# Patient Record
Sex: FEMALE | Race: WHITE | Employment: FULL TIME | URBAN - METROPOLITAN AREA
[De-identification: names, ages, dates, MRNs, and addresses within clinical notes are randomized per-mention and may not be internally consistent; named-entity substitution may affect disease eponyms.]

---

## 2017-09-26 ENCOUNTER — OP HISTORICAL/CONVERTED ENCOUNTER (OUTPATIENT)
Dept: OTHER | Age: 57
End: 2017-09-26

## 2018-07-30 ENCOUNTER — HOSPITAL ENCOUNTER (OUTPATIENT)
Dept: DIABETES SERVICES | Age: 58
Discharge: HOME | End: 2018-07-30
Payer: COMMERCIAL

## 2018-07-30 DIAGNOSIS — E11.9 DIABETES MELLITUS WITHOUT COMPLICATION (HCC): ICD-10-CM

## 2018-07-30 PROCEDURE — G0109 DIAB MANAGE TRN IND/GROUP: HCPCS | Performed by: DIETITIAN, REGISTERED

## 2018-08-16 ENCOUNTER — OFFICE VISIT (OUTPATIENT)
Dept: NEUROLOGY | Age: 58
End: 2018-08-16

## 2018-08-16 VITALS
SYSTOLIC BLOOD PRESSURE: 138 MMHG | HEIGHT: 65 IN | BODY MASS INDEX: 32.59 KG/M2 | HEART RATE: 68 BPM | DIASTOLIC BLOOD PRESSURE: 80 MMHG | WEIGHT: 195.6 LBS | TEMPERATURE: 98 F | OXYGEN SATURATION: 99 %

## 2018-08-16 DIAGNOSIS — R47.01 APHASIA: ICD-10-CM

## 2018-08-16 DIAGNOSIS — R42 DIZZINESS: ICD-10-CM

## 2018-08-16 DIAGNOSIS — R20.0 FACIAL NUMBNESS: ICD-10-CM

## 2018-08-16 DIAGNOSIS — G90.9 ANS (AUTONOMIC NERVOUS SYSTEM) DISEASE: ICD-10-CM

## 2018-08-16 DIAGNOSIS — R29.2 ABNORMAL DTR (DEEP TENDON REFLEX): ICD-10-CM

## 2018-08-16 DIAGNOSIS — R25.9 ABNORMAL INVOLUNTARY MOVEMENT: Primary | ICD-10-CM

## 2018-08-16 RX ORDER — GLIPIZIDE 5 MG/1
5 TABLET ORAL 2 TIMES DAILY
COMMUNITY
End: 2020-01-14

## 2018-08-16 RX ORDER — ROSUVASTATIN CALCIUM 40 MG/1
40 TABLET, COATED ORAL
COMMUNITY
End: 2020-05-20

## 2018-08-16 RX ORDER — ISOSORBIDE MONONITRATE 60 MG/1
60 TABLET, EXTENDED RELEASE ORAL
COMMUNITY

## 2018-08-16 RX ORDER — METFORMIN HYDROCHLORIDE 1000 MG/1
1000 TABLET ORAL 2 TIMES DAILY WITH MEALS
COMMUNITY
End: 2022-03-29 | Stop reason: ALTCHOICE

## 2018-08-16 RX ORDER — GABAPENTIN 600 MG/1
TABLET ORAL 3 TIMES DAILY
COMMUNITY
End: 2020-02-19

## 2018-08-16 RX ORDER — LOSARTAN POTASSIUM AND HYDROCHLOROTHIAZIDE 25; 100 MG/1; MG/1
1 TABLET ORAL DAILY
COMMUNITY

## 2018-08-16 NOTE — PROGRESS NOTES
New patient with numbness in lower lip and left side of face . Sx started  Long ago. Dx with possible TN by neuro 2 years ago , . Had  MRI 2 weeks for neck pain by ortho   Bulge in C2 and 3.

## 2018-08-16 NOTE — MR AVS SNAPSHOT
[address] [place] [address] [address] [facility] [id number] [phone] Patient: Prudencio Muir MRN: [id number] :1960 Visit Information Date & Time Provider Department Dept. Phone Encounter #  
 2018  9:00 AM Sofia Lopez MD [facility] Neurology [facility] [phone] [id number] Follow-up Instructions Return for After Tests. Upcoming Health Maintenance Date Due Hepatitis C Screening 1960 DTaP/Tdap/Td series (1 - Tdap) 1982 PAP AKA CERVICAL CYTOLOGY 1982 BREAST CANCER SCRN MAMMOGRAM 2011 FOBT Q 1 YEAR AGE 50-75 2011 Influenza Age 5 to Adult 2018 Allergies as of 2018  Review Complete On: 2018 By: Sofia Lopez MD  
 No Known Allergies Current Immunizations  Reviewed on 2011 Name Date Influenza Vaccine Whole 2010 Not reviewed this visit You Were Diagnosed With   
  
 Codes Comments Abnormal involuntary movement    -  Primary ICD-10-CM: R25.9 ICD-9-CM: 781.0 Facial numbness     ICD-10-CM: R20.0 ICD-9-CM: 782.0 Aphasia     ICD-10-CM: R47.01 
ICD-9-CM: 784.3 Dizziness     ICD-10-CM: C00 ICD-9-CM: 780.4 ANS (autonomic nervous system) disease     ICD-10-CM: G90.9 ICD-9-CM: 337.9 Abnormal DTR (deep tendon reflex)     ICD-10-CM: R29.2 ICD-9-CM: 796.1 Vitals BP Pulse Temp Height(growth percentile) Weight(growth percentile) SpO2  
 138/80 68 98 °F (36.7 °C) 5' 5\" (1.651 m) 195 lb 9.6 oz (88.7 kg) 99% BMI OB Status Smoking Status 32.55 kg/m2 Postmenopausal Never Smoker BMI and BSA Data Body Mass Index Body Surface Area 32.55 kg/m 2 2.02 m 2 Preferred Pharmacy Pharmacy Name Phone 310 [facility] 53 91 [facility] OF [address] [place] [address] ([phone] Your Updated Medication List  
  
   
 This list is accurate as of 8/16/18  9:44 AM.  Always use your most recent med list.  
  
  
  
  
 aspirin 81 mg tablet Take 81 mg by mouth. BYSTOLIC 5 mg tablet Generic drug:  nebivolol Take 10 mg by mouth daily. CRESTOR 40 mg tablet Generic drug:  rosuvastatin Take 40 mg by mouth nightly.  
  
 gabapentin 600 mg tablet Commonly known as:  NEURONTIN Take  by mouth three (3) times daily. glipiZIDE 5 mg tablet Commonly known as:  Izabella Albertson Take  by mouth two (2) times a day. isosorbide mononitrate ER 60 mg CR tablet Commonly known as:  IMDUR Take  by mouth every morning. losartan-hydroCHLOROthiazide 100-25 mg per tablet Commonly known as:  HYZAAR Take 1 Tab by mouth daily. metFORMIN 1,000 mg tablet Commonly known as:  GLUCOPHAGE Take 1,000 mg by mouth two (2) times daily (with meals). MICRO-K PO Take  by mouth. REPATHA SURECLICK pen injection Generic drug:  evolocumab  
by SubCUTAneous route. We Performed the Following EEG AMB NEURO [id number] CPT(R)] REFERRAL TO NEUROLOGY [MXD62 Custom] Comments:  
 ANS ANS ANS Follow-up Instructions Return for After Tests. To-Do List   
 08/16/2018 Imaging:  DUPLEX CAROTID BILATERAL AMB NEURO   
  
 08/16/2018 Neurology:  EMG LIMITED   
  
 08/16/2018 Imaging:  MRI BRAIN W WO CONT   
  
 09/24/2018 5:30 PM  
  Appointment with DIABETES CLASS #3 [facility] at [address] [facility] [address] ([phone]) Referral Information Referral ID Referred By Referred To  
  
 [id number] [place], [address] [place] [address] Jose, MD Marcus [address] [place] [address] Phone: [phone] Fax: [phone] Visits Status Start Date End Date 1 New Request 8/16/18 8/16/19 If your referral has a status of pending review or denied, additional information will be sent to support the outcome of this decision. Patient Instructions PRESCRIPTION REFILL POLICY [facility] Statement to Patients April 1, 2014 In an effort to ensure the large volume of patient prescription refills is processed in the most efficient and expeditious manner, we are asking our patients to assist us by calling your Pharmacy for all prescription refills, this will include also your  Mail Order Pharmacy. The pharmacy will contact our office electronically to continue the refill process. Please do not wait until the last minute to call your pharmacy. We need at least 48 hours (2days) to fill prescriptions. We also encourage you to call your pharmacy before going to  your prescription to make sure it is ready. With regard to controlled substance prescription refill requests (narcotic refills) that need to be picked up at our office, we ask your cooperation by providing us with at least 72 hours (3days) notice that you will need a refill. We will not refill narcotic prescription refill requests after 4:00pm on any weekday, Monday through Thursday, or after 2:00pm on Fridays, or on the weekends. We encourage everyone to explore another way of getting your prescription refill request processed using Questli, our patient web portal through our electronic medical record system. Questli is an efficient and effective way to communicate your medication request directly to the office and  downloadable as an radha on your smart phone . Questli also features a review functionality that allows you to view your medication list as well as leave messages for your physician. Are you ready to get connected? If so please review the attatched instructions or speak to any of our staff to get you set up right away! Thank you so much for your cooperation. Should you have any questions please contact our Practice Administrator. The Physicians and Staff,  [facility] Information Regarding Testing If you have physican order for a test or a medication denied by your insurance company, this does not mean the test or medication is not appropriate for you as that is a medical decision, not a decision to be made by an insurance company representative or by an [facility] physician who has not interviewed and examined you. This is a decision to be made between you and your physician. The denial of services is a contractual matter between you and your insurance company, not an issue between your physician and the insurance company. If your test or medication is denied, you can take the following steps to help resolve the issue: 1. File a complaint with the [facility] regarding your insurance company's denial of services ordered for you. You can do this either by calling them directly or by completing an on-line complaint form on the [employer]. This can be found at [email or web address] 2. Also file a formal complaint with your insurance company and ask to have the name of the person denying the service so that you may explore a legal option should you be harmed by this denial of service. Again, the fact the insurance company will not pay for the service does not mean it is not medically necessary and I would encourage you to follow through with the plan that was made with your physician 3. File a written complaint with your employer so your employer and benefit manager is aware of the poor coverage they are providing their employees. If you have medicare/medicaid, complain to your representative in the House and to your Nila Thomas. If we have ordered testing for you, we do not call patients with results and we do not give test results over the phone. We schedule follow up appointments so that your results can be discussed in person and any questions you have regarding them may be addressed.   If something of concern is revealed on your test, we will call you for a sooner follow up appointment. Additionally, results may be found by using the My Chart feature and one of our patient service representatives at the  can give you instructions on how to access this feature of our electronic medical record system. Rick Loza 1721 What is a living will? A living will is a legal form you use to write down the kind of care you want at the end of your life. It is used by the health professionals who will treat you if you aren't able to decide for yourself. If you put your wishes in writing, your loved ones and others will know what kind of care you want. They won't need to guess. This can ease your mind and be helpful to others. A living will is not the same as an estate or property will. An estate will explains what you want to happen with your money and property after you die. Is a living will a legal document? A living will is a legal document. Each state has its own laws about living jeong. If you move to another state, make sure that your living will is legal in the state where you now live. Or you might use a universal form that has been approved by many states. This kind of form can sometimes be completed and stored online. Your electronic copy will then be available wherever you have a connection to the Internet. In most cases, doctors will respect your wishes even if you have a form from a different state. · You don't need an  to complete a living will. But legal advice can be helpful if your state's laws are unclear, your health history is complicated, or your family can't agree on what should be in your living will. · You can change your living will at any time. Some people find that their wishes about end-of-life care change as their health changes.  
· In addition to making a living will, think about completing a medical power of  form. This form lets you name the person you want to make end-of-life treatment decisions for you (your \"health care agent\") if you're not able to. Many hospitals and nursing homes will give you the forms you need to complete a living will and a medical power of . · Your living will is used only if you can't make or communicate decisions for yourself anymore. If you become able to make decisions again, you can accept or refuse any treatment, no matter what you wrote in your living will. · Your state may offer an online registry. This is a place where you can store your living will online so the doctors and nurses who need to treat you can find it right away. What should you think about when creating a living will? Talk about your end-of-life wishes with your family members and your doctor. Let them know what you want. That way the people making decisions for you won't be surprised by your choices. Think about these questions as you make your living will: · Do you know enough about life support methods that might be used? If not, talk to your doctor so you know what might be done if you can't breathe on your own, your heart stops, or you're unable to swallow. · What things would you still want to be able to do after you receive life-support methods? Would you want to be able to walk? To speak? To eat on your own? To live without the help of machines? · If you have a choice, where do you want to be cared for? In your home? At a hospital or nursing home? · Do you want certain Sabianism practices performed if you become very ill? · If you have a choice at the end of your life, where would you prefer to die? At home? In a hospital or nursing home? Somewhere else? · Would you prefer to be buried or cremated? · Do you want your organs to be donated after you die? What should you do with your living will?  
· Make sure that your family members and your health care agent have copies of your living will. · Give your doctor a copy of your living will to keep in your medical record. If you have more than one doctor, make sure that each one has a copy. · You may want to put a copy of your living will where it can be easily found. Where can you learn more? Go to http://shereen-samara.info/. Enter X183 in the search box to learn more about \"Learning About Living Estephania Novak. \" Current as of: October 6, 2017 Content Version: 11.7 © 9733-2599 [employer]. Care instructions adapted under license by [employer] (which disclaims liability or warranty for this information). If you have questions about a medical condition or this instruction, always ask your healthcare professional. [address] any warranty or liability for your use of this information. Introducing hospitals & HEALTH SERVICES! [employer] introduces TekLinks patient portal. Now you can access parts of your medical record, email your doctor's office, and request medication refills online. 1. In your internet browser, go to [email or web address] 2. Click on the First Time User? Click Here link in the Sign In box. You will see the New Member Sign Up page. 3. Enter your TekLinks Access Code exactly as it appears below. You will not need to use this code after youve completed the sign-up process. If you do not sign up before the expiration date, you must request a new code. · TekLinks Access Code: [facility] Expires: 10/30/2018  7:30 AM 
 
4. Enter the last four digits of your Social Security Number (xxxx) and Date of Birth (mm/dd/yyyy) as indicated and click Submit. You will be taken to the next sign-up page. 5. Create a TekLinks ID. This will be your TekLinks login ID and cannot be changed, so think of one that is secure and easy to remember. 6. Create a fluid Operationst password. You can change your password at any time. 7. Enter your Password Reset Question and Answer. This can be used at a later time if you forget your password. 8. Enter your e-mail address. You will receive e-mail notification when new information is available in [address] 9. Click Sign Up. You can now view and download portions of your medical record. 10. Click the Download Summary menu link to download a portable copy of your medical information. If you have questions, please visit the Frequently Asked Questions section of the [employer] website. Remember, [employer] is NOT to be used for urgent needs. For medical emergencies, dial 911. Now available from your iPhone and Android! Please provide this summary of care documentation to your next provider. Your primary care clinician is listed as PROVIDER UNKNOWN. If you have any questions after today's visit, please call [phone].

## 2018-08-16 NOTE — PROGRESS NOTES
[address] [facility] [address]   [place] [address]   [facility]    PSXMOY    Fax             Referring: Self    Chief Complaint   Patient presents with    Tingling     New Patient      60-year-old right-handed woman who presents today for evaluation of what she calls facial pain and numbness on the left side as well as uncontrolled shaking of the left hand. She says that several years ago she began to have pain on the left side of the face that she notes is in various places. She notes that it has been progressive and spreading to encompass the entire left side of the face. She notes that the left side of the face is numb. She has difficulty getting out the right words. People would notice. She notes that her speech slurs. She saw a neurologist who told her that she had Bell's palsy. She did have some drooping of the face. She says that got better. She notes that at times she is unable to get out the proper word that seems to be sporadic. She then notes that about 2 months ago she started to have uncontrolled shaking of the left upper extremity. This will last for about 30 minutes. She is unable to hold anything in the hand. She says she was holding a drink and she could not hold that drink in her hand. She then went to get a bottle of water of the refrigerator and the hand was shaking so much that she could not hold it. It continued and her  witnessed this. Her face then started to twitch on the left side. It did not progress into the lower extremity. She maintained consciousness. No double vision. No weakness. The fingers in the left hand have become numb. This has been progressive. No changes in her medication except metoprolol was added. She has not had any injury. No fever chills.   She has been seen by orthopedics thinking this might of been a neck issue because she had a cervical fusion at C6-C7 back in 2016.  She had a recent MRI of the neck that just showed a bulging disc at C2-C3. No radicular type symptom. She has not had any brain imaging. She has not had an EEG. She does have significant risk factors for a vascular issue including hypertension dyslipidemia and uncontrolled diabetes with a last hemoglobin A1c of 5.9. She is going to diabetes class upcoming. She has never had anything like this before. She does note that she gets dizzy not positional noting that she feels lightheaded at times even in the seated position. No spinning. No visual loss or cut. No symptoms on the right side of her body. She does note that her feet are numb and she has pain particularly in the left foot and the left toe and does not get pedicures anymore. She has abnormalities of sweating noting that she will just sweat for no reason. She has not been diagnosed with atrial fibrillation but has had some increased heart rate. That is why the metoprolol was started. Never had an EMG. Never had any autonomic nervous system testing.       Past Medical History:   Diagnosis Date    Anxiety     Arthritis     Constipation     Coronary atherosclerosis of native coronary artery     s/p CABG    DJD (degenerative joint disease)     Fatigue     Headache     High cholesterol     Hypertension     Joint pain     Muscle pain     Neck pain     Nephrolithiasis        Past Surgical History:   Procedure Laterality Date    ARTHROPLASTY,ELBOW,IMPLNT/RECONSTRUC      ECHO 2D ADULT  1/2012    EF50-60%, mild MR    HC DEVICE ABLAT UTER NOVASURE      2007    HX CORONARY ARTERY BYPASS GRAFT  1/2012    LIMA-LAD, SVG-OM1-OM2, SVG-PDA    HX HEART CATHETERIZATION  1/2012    Inf hypo EF 45-50%, LAD 30, 80, 50%; LCx 40%, OM1 90%, RCA 90%,     NM CARDIAC SPECT W STRS/REST MULT  12/2011    NOrmal perfusion, EF 59%       Current Outpatient Prescriptions   Medication Sig Dispense Refill    losartan-hydroCHLOROthiazide (HYZAAR) 100-25 mg per tablet Take 1 Tab by mouth daily.  evolocumab (REPATHA SURECLICK) pen injection by SubCUTAneous route.  metFORMIN (GLUCOPHAGE) 1,000 mg tablet Take 1,000 mg by mouth two (2) times daily (with meals).  isosorbide mononitrate ER (IMDUR) 60 mg CR tablet Take  by mouth every morning.  gabapentin (NEURONTIN) 600 mg tablet Take  by mouth three (3) times daily.  glipiZIDE (GLUCOTROL) 5 mg tablet Take  by mouth two (2) times a day.  rosuvastatin (CRESTOR) 40 mg tablet Take 40 mg by mouth nightly.  nebivolol (BYSTOLIC) 5 mg tablet Take 10 mg by mouth daily.  aspirin 81 mg tablet Take 81 mg by mouth.  POTASSIUM CHLORIDE (MICRO-K PO) Take  by mouth. No Known Allergies    Social History   Substance Use Topics    Smoking status: Never Smoker    Smokeless tobacco: Never Used    Alcohol use No      Comment: occasionally     Family History   Problem Relation Age of Onset    Coronary Artery Disease Mother     Diabetes Mother     Hypertension Mother     Heart Disease Maternal Grandmother     Heart Disease Maternal Grandfather      Review of Systems  Pertinent positives and negatives as noted with remainder of comprehensive review negative    Examination  Visit Vitals    /80    Pulse 68    Temp 98 °F (36.7 °C)    Ht 5' 5\" (1.651 m)    Wt 88.7 kg (195 lb 9.6 oz)    SpO2 99%    BMI 32.55 kg/m2     Pleasant, well appearing overweight woman who has appropriate dress grooming and affect. No icterus. Oropharynx clear. Supple neck without bruit. Heart regular. No murmur. No edema. She is awake alert oriented and conversant. She has normal speech-language cognition and attention. Cranial nerves are intact 2-12. No afferent pupillary defect. She has no pronation drift and her strength is full in the upper and lower extremities in all muscle groups to direct conversational testing. No abnormal movement is noticed on examination today.   Her reflexes are symmetrical in the upper and lower extremities laterally. No ankle jerks are present bilaterally. Toes are mute. No Lia. No ataxia. Her gait is steady. Sensory is intact primary. Impression/Plan  78-year-old woman with complaints of left-sided facial drooping facial numbness on the left side as well and episodic difficulty with aphasia and then coupled with involuntary movements of that left upper extremity as noted above suspicious for ictus all pointing to a right hemispheric abnormality assuming right hemispheric language dominance that would point us to a right hemispheric anatomic abnormality and to that end we will get MRI of the brain looking for such and also EEG to evaluate for epileptiform abnormalities. Also differential would include vascular abnormalities will get a carotid Doppler. Symptoms have been progressive and again would make a mass lesion high on the differential as well. She does have vascular risk factors including hypertension dyslipidemia and uncontrolled diabetes. In regard to that uncontrolled diabetes her examination is also consistent with no ankle jerks and she describes symptoms of dysesthesia in the lower extremities and also disordered sweating as well as dizziness which would point to dysfunction of the autonomic nervous system and given that hemoglobin A1c of greater than 9 and the symptoms of deranged autonomic nervous system dysfunction we will proceed with an EMG and autonomic nervous system testing. She will return at the conclusion of that testing. Cesar Garay MD      This note was created using voice recognition software. Despite editing, there may be syntax errors. This note will not be viewable in [address]

## 2018-08-16 NOTE — PATIENT INSTRUCTIONS
[facility]   Statement to Patients  April 1, 2014      In an effort to ensure the large volume of patient prescription refills is processed in the most efficient and expeditious manner, we are asking our patients to assist us by calling your Pharmacy for all prescription refills, this will include also your  Mail Order Pharmacy. The pharmacy will contact our office electronically to continue the refill process. Please do not wait until the last minute to call your pharmacy. We need at least 48 hours (2days) to fill prescriptions. We also encourage you to call your pharmacy before going to  your prescription to make sure it is ready. With regard to controlled substance prescription refill requests (narcotic refills) that need to be picked up at our office, we ask your cooperation by providing us with at least 72 hours (3days) notice that you will need a refill. We will not refill narcotic prescription refill requests after 4:00pm on any weekday, Monday through Thursday, or after 2:00pm on Fridays, or on the weekends. We encourage everyone to explore another way of getting your prescription refill request processed using [employer], our patient web portal through our electronic medical record system. Windows[employer] is an efficient and effective way to communicate your medication request directly to the office and  downloadable as an radha on your smart phone . [employer] also features a review functionality that allows you to view your medication list as well as leave messages for your physician. Are you ready to get connected? If so please review the attatched instructions or speak to any of our staff to get you set up right away! Thank you so much for your cooperation. Should you have any questions please contact our Practice Administrator.     The Physicians and Staff,  [facility]   Information Regarding Testing     If you have physican order for a test or a medication denied by your insurance company, this does not mean the test or medication is not appropriate for you as that is a medical decision, not a decision to be made by an insurance company representative or by an [facility] physician who has not interviewed and examined you. This is a decision to be made between you and your physician. The denial of services is a contractual matter between you and your insurance company, not an issue between your physician and the insurance company. If your test or medication is denied, you can take the following steps to help resolve the issue:    1. File a complaint with the [facility] regarding your insurance company's denial of services ordered for you. You can do this either by calling them directly or by completing an on-line complaint form on the [employer]. This can be found at [email or web address]    2. Also file a formal complaint with your insurance company and ask to have the name of the person denying the service so that you may explore a legal option should you be harmed by this denial of service. Again, the fact the insurance company will not pay for the service does not mean it is not medically necessary and I would encourage you to follow through with the plan that was made with your physician    3. File a written complaint with your employer so your employer and benefit manager is aware of the poor coverage they are providing their employees. If you have medicare/medicaid, complain to your representative in the House and to your Nila Thomas. If we have ordered testing for you, we do not call patients with results and we do not give test results over the phone. We schedule follow up appointments so that your results can be discussed in person and any questions you have regarding them may be addressed.   If something of concern is revealed on your test, we will call you for a sooner follow up appointment. Additionally, results may be found by using the My Chart feature and one of our patient service representatives at the  can give you instructions on how to access this feature of our electronic medical record system. Learning About Living Ashanti  What is a living will? A living will is a legal form you use to write down the kind of care you want at the end of your life. It is used by the health professionals who will treat you if you aren't able to decide for yourself. If you put your wishes in writing, your loved ones and others will know what kind of care you want. They won't need to guess. This can ease your mind and be helpful to others. A living will is not the same as an estate or property will. An estate will explains what you want to happen with your money and property after you die. Is a living will a legal document? A living will is a legal document. Each state has its own laws about living jeong. If you move to another state, make sure that your living will is legal in the state where you now live. Or you might use a universal form that has been approved by many states. This kind of form can sometimes be completed and stored online. Your electronic copy will then be available wherever you have a connection to the Internet. In most cases, doctors will respect your wishes even if you have a form from a different state. · You don't need an  to complete a living will. But legal advice can be helpful if your state's laws are unclear, your health history is complicated, or your family can't agree on what should be in your living will. · You can change your living will at any time. Some people find that their wishes about end-of-life care change as their health changes. · In addition to making a living will, think about completing a medical power of  form.  This form lets you name the person you want to make end-of-life treatment decisions for you (your \"health care agent\") if you're not able to. Many hospitals and nursing homes will give you the forms you need to complete a living will and a medical power of . · Your living will is used only if you can't make or communicate decisions for yourself anymore. If you become able to make decisions again, you can accept or refuse any treatment, no matter what you wrote in your living will. · Your state may offer an online registry. This is a place where you can store your living will online so the doctors and nurses who need to treat you can find it right away. What should you think about when creating a living will? Talk about your end-of-life wishes with your family members and your doctor. Let them know what you want. That way the people making decisions for you won't be surprised by your choices. Think about these questions as you make your living will:  · Do you know enough about life support methods that might be used? If not, talk to your doctor so you know what might be done if you can't breathe on your own, your heart stops, or you're unable to swallow. · What things would you still want to be able to do after you receive life-support methods? Would you want to be able to walk? To speak? To eat on your own? To live without the help of machines? · If you have a choice, where do you want to be cared for? In your home? At a hospital or nursing home? · Do you want certain Jewish practices performed if you become very ill? · If you have a choice at the end of your life, where would you prefer to die? At home? In a hospital or nursing home? Somewhere else? · Would you prefer to be buried or cremated? · Do you want your organs to be donated after you die? What should you do with your living will? · Make sure that your family members and your health care agent have copies of your living will. · Give your doctor a copy of your living will to keep in your medical record.  If you have more than one doctor, make sure that each one has a copy. · You may want to put a copy of your living will where it can be easily found. Where can you learn more? Go to http://shereen-samara.info/. Enter E965 in the search box to learn more about \"Learning About Living [place]. \"  Current as of: October 6, 2017  Content Version: 11.7  © 2973-4937 [employer]. Care instructions adapted under license by [employer] (which disclaims liability or warranty for this information). If you have questions about a medical condition or this instruction, always ask your healthcare professional. [address] any warranty or liability for your use of this information.

## 2018-08-20 ENCOUNTER — TELEPHONE (OUTPATIENT)
Dept: NEUROLOGY | Age: 58
End: 2018-08-20

## 2018-08-20 NOTE — TELEPHONE ENCOUNTER
----- Message from Mare Wong sent at 8/20/2018  3:37 PM EDT -----  Regarding: Dr Griffin Going from [facility] call back nurse from pt review unit [phone], follow prompts to out pt review case #[id number],   Regarding,Pt test for Autonomic nervus testing  all codes were denied , for test.    Was noted as being experimental  And the guidelines ,     Call the above number and any of the nurses can discuss the appeal options for pt.

## 2018-08-21 NOTE — TELEPHONE ENCOUNTER
Called insurance co spoke to flip persaud  All codes 36668,[id number] denied   expermintal  May do P2P by calling same number and following prompts  Or fax clinicals for reconsideration @[phone]  Include case # and state for reconsideration

## 2018-08-28 ENCOUNTER — OFFICE VISIT (OUTPATIENT)
Dept: NEUROLOGY | Age: 58
End: 2018-08-28

## 2018-08-28 DIAGNOSIS — R41.3 MEMORY CHANGE: ICD-10-CM

## 2018-08-28 DIAGNOSIS — R25.9 ABNORMAL INVOLUNTARY MOVEMENT: Primary | ICD-10-CM

## 2018-08-31 ENCOUNTER — HOSPITAL ENCOUNTER (OUTPATIENT)
Dept: MRI IMAGING | Age: 58
Discharge: HOME | End: 2018-08-31
Attending: PSYCHIATRY & NEUROLOGY
Payer: COMMERCIAL

## 2018-08-31 DIAGNOSIS — R47.01 APHASIA: ICD-10-CM

## 2018-08-31 DIAGNOSIS — R20.0 FACIAL NUMBNESS: ICD-10-CM

## 2018-08-31 DIAGNOSIS — R42 DIZZINESS: ICD-10-CM

## 2018-08-31 DIAGNOSIS — G90.9 ANS (AUTONOMIC NERVOUS SYSTEM) DISEASE: ICD-10-CM

## 2018-08-31 DIAGNOSIS — R25.9 ABNORMAL INVOLUNTARY MOVEMENT: ICD-10-CM

## 2018-08-31 DIAGNOSIS — R29.2 ABNORMAL DTR (DEEP TENDON REFLEX): ICD-10-CM

## 2018-08-31 PROCEDURE — 74011250636 HC RX REV CODE- 250/636: Performed by: PSYCHIATRY & NEUROLOGY

## 2018-08-31 PROCEDURE — A9575 INJ GADOTERATE MEGLUMI 0.1ML: HCPCS | Performed by: PSYCHIATRY & NEUROLOGY

## 2018-08-31 PROCEDURE — 70553 MRI BRAIN STEM W/O & W/DYE: CPT

## 2018-08-31 RX ORDER — GADOTERATE MEGLUMINE 376.9 MG/ML
16 INJECTION INTRAVENOUS
Status: COMPLETED | OUTPATIENT
Start: 2018-08-31 | End: 2018-08-31

## 2018-08-31 RX ADMIN — GADOTERATE MEGLUMINE 16 ML: 376.9 INJECTION INTRAVENOUS at 08:54

## 2018-09-02 NOTE — PROCEDURES
EEG:      Date:  8/28/2018     Requesting Physician:  Shara Kitchen. MD Adalberto    An EEG is requested in this 65 y.o. with abnormal involuntary movements to evaluate for epileptiform abnormalities. Movements said to include uncontrolled shaking of her left hand. Medications:  Medications listed as Hyzaar, Glucophage, Crestor, Neurontin, aspirin, Imdur, Repatha, Glucotrol. This tracing is obtained during the awake, drowsy and sleeping states. During wakefulness there are brief intermittent runs of posteriorly-dominant and symmetrical low-to-medium amplitude 11 cycle per second activities which attenuate with eye opening. The majority of background activity is low-voltage, fast-frequency beta wave activity. Hyperventilation is not performed due to age and medical history. Intermittent photic stimulation induces symmetric posterior driving responses. Stage 2 sleep is attained. Interpretation: This EEG recorded during the awake and sleeping states is normal.  No epileptiform abnormalities are seen.

## 2018-09-07 ENCOUNTER — OFFICE VISIT (OUTPATIENT)
Dept: NEUROLOGY | Age: 58
End: 2018-09-07

## 2018-09-07 DIAGNOSIS — G90.9 ANS (AUTONOMIC NERVOUS SYSTEM) DISEASE: ICD-10-CM

## 2018-09-07 DIAGNOSIS — R20.0 FACIAL NUMBNESS: ICD-10-CM

## 2018-09-07 DIAGNOSIS — R47.01 APHASIA: ICD-10-CM

## 2018-09-07 DIAGNOSIS — R29.2 ABNORMAL DTR (DEEP TENDON REFLEX): ICD-10-CM

## 2018-09-07 DIAGNOSIS — R20.2 PARESTHESIA: Primary | ICD-10-CM

## 2018-09-07 DIAGNOSIS — R25.9 ABNORMAL INVOLUNTARY MOVEMENT: ICD-10-CM

## 2018-09-07 DIAGNOSIS — R42 DIZZINESS: Primary | ICD-10-CM

## 2018-09-07 NOTE — PROCEDURES
EMG/ NCS Report   [facility]  [place], [address] [place], [address]   Ph: [phone]   FAX: [phone]  Test Date:  2018    Patient: Ry Macias : 1960 Physician: Mariana Jane MD   Sex: Female Height: ' \" Ref Phys: Avinash Winter MD   ID#: [id number] Weight:  lbs. Technician: Celena Smith     Patient History / Exam:  CC:TONIA. LOWERS/NEUROPATHY    Having intermittent numbness of the L face, L UE and L LE, (+) uncontrolled DM (+) neck surgery    Patient is coming for neuropathy evaluation. EMG & NCV Findings:  Evaluation of the left Fibular motor and the right Fibular motor nerves showed normal distal onset latency (L3.0, R3.2 ms), normal amplitude (L2.6, R2.7 mV), normal conduction velocity (B Fib-Ankle, L47, R48 m/s), and normal conduction velocity (Poplt-B Fib, L56, R50 m/s). The left tibial motor and the right tibial motor nerves showed normal distal onset latency (L3.7, R5.2 ms), normal amplitude (L12.6, R10.3 mV), and normal conduction velocity (Knee-Ankle, L47, R54 m/s). The left Sup Fibular sensory nerve showed normal distal peak latency (2.3 ms), normal amplitude (10.2 µV), and normal conduction velocity (Lower leg-Lat ankle, 59 m/s). The left sural sensory and the right sural sensory nerves showed normal distal peak latency (L3.3, R3.1 ms) and normal amplitude (L14.9, R19.9 µV). All F Wave latencies were within normal limits. All F Wave left vs. right side latency differences were within normal limits. All H Reflex left vs. right side latency differences were within normal limits. All examined muscles (as indicated in the following table) showed no evidence of electrical instability.         Impression:    Extensive electrodiagnostic examination of the left and right lower extremities is normal.    Specifically, there is no evidence of a peripheral neuropathy or lumbosacral motor radiculopathy.           Jacinto Paniagua MD  Diplomate, American Board of Psychiatry and Neurology  Diplomate, Neuromuscular Medicine  Diplomate, American Board of Electrodiagnostic Medicine  Director, [address] [place] [address] Accredited Laboratory with Exemplary Status      Nerve Conduction Studies  Anti Sensory Summary Table     Stim Site NR Peak (ms) Norm Peak (ms) P-T Amp (µV) Norm P-T Amp Site1 Site2 Dist (cm)   Left Sup Fibular Anti Sensory (Lat ankle)  32°C   Lower leg    2.3 <4.5 10.2 >5 Lower leg Lat ankle 10.0   Right Sup Fibular Anti Sensory (Lat ankle)  33.8°C   Site 2    2.4  12.2       Left Sural Anti Sensory (Lat Mall)  32.6°C   Calf    3.3 <4.5 14.9 >4.0 Calf Lat Mall 14.0   Right Sural Anti Sensory (Lat Mall)  35.4°C   Calf    3.1 <4.5 19.9 >4.0 Calf Lat Mall 14.0     Motor Summary Table     Stim Site NR Onset (ms) Norm Onset (ms) O-P Amp (mV) Norm O-P Amp Amp (Prev) (%) Site1 Site2 Dist (cm) Gideon (m/s) Norm Gideon (m/s)   Left Fibular Motor (Ext Dig Brev)  31.7°C   Ankle    3.0 <6.5 2.6 >2.6 100.0 Ankle Ext Dig Brev 8.0     B Fib    9.8  2.4  92.3 B Fib Ankle 32.0 47 >38   Poplt    11.6  2.4  100.0 Poplt B Fib 10.0 56 >42   Right Fibular Motor (Ext Dig Brev)  33°C   Ankle    3.2 <6.5 2.7 >2.6 100.0 Ankle Ext Dig Brev 8.0     B Fib    9.8  2.5  92.6 B Fib Ankle 32.0 48 >38   Poplt    11.8  2.2  88.0 Poplt B Fib 10.0 50 >42   Left Tibial Motor (Abd Lawrence Brev)  31.2°C   Ankle    3.7 <6.1 12.6 >5.3 100.0 Ankle Abd Lawrence Brev 8.0     Knee    11.2  9.9  78.6 Knee Ankle 35.0 47 >39   Right Tibial Motor (Abd Lawrence Brev)  31.9°C   Ankle    5.2 <6.1 10.3 >5.3 100.0 Ankle Abd Lawrence Brev 8.0     Knee    12.1  7.6  73.8 Knee Ankle 37.0 54 >39     F Wave Studies     NR F-Lat (ms) Lat Norm (ms) L-R F-Lat (ms) L-R Lat Norm   Left Tibial (Mrkrs) (Abd Hallucis)  30.9°C      45.23 <56 5.66 <5.7   Right Tibial (Mrkrs) (Abd Hallucis)  31.1°C      50.89 <56 5.66 <5.7     H Reflex Studies     NR H-Lat (ms) L-R H-Lat (ms) L-R Lat Norm   Left Tibial (Gastroc)  30.8°C      32.67 0.93 <2.0   Right Tibial (Gastroc)  31.1°C      31.73 0.93 <2.0     EMG     Side Muscle Nerve Root Ins Act Fibs Psw Recrt Duration Amp Poly Comment   Left Ext Dig Brev Dp Br Peron L5, S1 Nml Nml Nml Nml Nml Nml Nml    Left AbdHallucis MedPlantar S1-2 Nml Nml Nml Nml Nml Nml Nml    Left AntTibialis Dp Br Peron L4-5 Nml Nml Nml Nml Nml Nml Nml    Left MedGastroc Tibial S1-2 Nml Nml Nml Nml Nml Nml Nml    Left VastusLat Femoral L2-4 Nml Nml Nml Nml Nml Nml Nml    Right Ext Dig Brev Dp Br Peron L5, S1 Nml Nml Nml Nml Nml Nml Nml    Right AbdHallucis MedPlantar S1-2 Nml Nml Nml Nml Nml Nml Nml    Right AntTibialis Dp Br Peron L4-5 Nml Nml Nml Nml Nml Nml Nml    Right MedGastroc Tibial S1-2 Nml Nml Nml Nml Nml Nml Nml    Right VastusLat Femoral L2-4 Nml Nml Nml Nml Nml Nml Nml    Left GluteusMed SupGluteal L4-S1 Nml Nml Nml Nml Nml Nml Nml    Left Lower Lumb Parasp Rami L5,S1 Nml Nml Nml Nml Nml Nml Nml                Nerve Conduction Studies  Anti Sensory Left/Right Comparison     Stim Site L Lat (ms) R Lat (ms) L-R Lat (ms) L Amp (µV) R Amp (µV) L-R Amp (%) Site1 Site2 L Gideon (m/s) R Gideon (m/s) L-R Gideon (m/s)   Sup Fibular Anti Sensory (Lat ankle)  32°C   Lower leg 1.7   10.2   Lower leg Lat ankle 59     Sural Anti Sensory (Lat Mall)  32.6°C   Calf 2.7 2.6 0.1 14.9 19.9 25.1 Calf Lat Mall 52 54 2     Motor Left/Right Comparison     Stim Site L Lat (ms) R Lat (ms) L-R Lat (ms) L Amp (mV) R Amp (mV) L-R Amp (%) Site1 Site2 L Gideon (m/s) R Gideon (m/s) L-R Gideon (m/s)   Fibular Motor (Ext Dig Brev)  31.7°C   Ankle 3.0 3.2 0.2 2.6 2.7 3.7 Ankle Ext Dig Brev      B Fib 9.8 9.8 0.0 2.4 2.5 4.0 B Fib Ankle 47 48 1   Poplt 11.6 11.8 0.2 2.4 2.2 8.3 Poplt B Fib 56 50 6   Tibial Motor (Abd Lawrence Brev)  31.2°C   Ankle 3.7 5.2 1.5 12.6 10.3 18.3 Ankle Abd Lawrence Brev      Knee 11.2 12.1 0.9 9.9 7.6 23.2 Knee Ankle 47 54 7         Waveforms:

## 2018-09-10 ENCOUNTER — HOSPITAL ENCOUNTER (OUTPATIENT)
Dept: DIABETES SERVICES | Age: 58
Discharge: HOME | End: 2018-09-10
Attending: FAMILY MEDICINE
Payer: COMMERCIAL

## 2018-09-10 DIAGNOSIS — E11.9 DIABETES MELLITUS WITHOUT COMPLICATION (HCC): ICD-10-CM

## 2018-09-10 PROCEDURE — G0109 DIAB MANAGE TRN IND/GROUP: HCPCS | Performed by: DIETITIAN, REGISTERED

## 2018-09-13 NOTE — PROCEDURES
Carotid Doppler:     Date:  9/7/2018     Requesting Physician:  Natalya Perez MD     Indication:  Dizziness, aphasia, abnormal movements     B-mode imaging reveals mild plaque at the bifurcations bilaterally. Doppler spectral analysis reveals no significant velocity shifts. Vertebral artery flow antegrade bilaterally. Interpretation:    1. Plaque as noted. 2. No significant stenosis.

## 2018-09-19 ENCOUNTER — OFFICE VISIT (OUTPATIENT)
Dept: NEUROLOGY | Age: 58
End: 2018-09-19

## 2018-09-19 VITALS
SYSTOLIC BLOOD PRESSURE: 148 MMHG | WEIGHT: 195 LBS | HEIGHT: 65 IN | BODY MASS INDEX: 32.49 KG/M2 | DIASTOLIC BLOOD PRESSURE: 96 MMHG

## 2018-09-19 DIAGNOSIS — R20.0 FACIAL NUMBNESS: Primary | ICD-10-CM

## 2018-09-19 DIAGNOSIS — R25.9 ABNORMAL INVOLUNTARY MOVEMENTS: ICD-10-CM

## 2018-09-19 DIAGNOSIS — R51.9 INTRACTABLE HEADACHE, UNSPECIFIED CHRONICITY PATTERN, UNSPECIFIED HEADACHE TYPE: ICD-10-CM

## 2018-09-19 RX ORDER — AMITRIPTYLINE HYDROCHLORIDE 25 MG/1
25 TABLET, FILM COATED ORAL
Qty: 30 TAB | Refills: 5 | Status: SHIPPED | OUTPATIENT
Start: 2018-09-19 | End: 2019-05-07 | Stop reason: SDUPTHER

## 2018-09-19 NOTE — PROGRESS NOTES
Prudencio Muir is a 65 y.o. female who presents with the following  Chief Complaint   Patient presents with    Results       HPI Patient comes in for a follow up for MRI brain, EEG, doppler, and EMG to evaluate involuntary movements of bilateral UE, aphasia, visual disturbance, left sided facial numbness, tingling. These symptoms happen on a daily basis and with no inciting factor. She has noticed the symptoms are getting more frequent and noticeable by her work, family. She does have a hx of  A cervical fusion and did have a recent MRI c spine which we will get this result from as evaluation of her arm and hand numbness on both UE. She does have some trouble sleeping. Does have some slight issues with her mood. Neck pain, chronic daily headaches also. She feels like they are coming from the neck. She states the pain is daily. The pain is tight. She gives an example also of being zoned out co-worker noticed that she was confused, pupils very dilated, and just completely out of it for a while and gradually this got better. This has happened a few times before. Her report was jibberish. Was tired, sleepy the rest of the day. No Known Allergies    Current Outpatient Prescriptions   Medication Sig    amitriptyline (ELAVIL) 25 mg tablet Take 1 Tab by mouth nightly.  losartan-hydroCHLOROthiazide (HYZAAR) 100-25 mg per tablet Take 1 Tab by mouth daily.  evolocumab (REPATHA SURECLICK) pen injection by SubCUTAneous route.  metFORMIN (GLUCOPHAGE) 1,000 mg tablet Take 1,000 mg by mouth two (2) times daily (with meals).  isosorbide mononitrate ER (IMDUR) 60 mg CR tablet Take  by mouth every morning.  gabapentin (NEURONTIN) 600 mg tablet Take  by mouth three (3) times daily.  glipiZIDE (GLUCOTROL) 5 mg tablet Take  by mouth two (2) times a day.  rosuvastatin (CRESTOR) 40 mg tablet Take 40 mg by mouth nightly.  nebivolol (BYSTOLIC) 5 mg tablet Take 10 mg by mouth daily.     aspirin 81 mg tablet Take 81 mg by mouth.  POTASSIUM CHLORIDE (MICRO-K PO) Take  by mouth. No current facility-administered medications for this visit. History   Smoking Status    Never Smoker   Smokeless Tobacco    Never Used       Past Medical History:   Diagnosis Date    Anxiety     Arthritis     Constipation     Coronary atherosclerosis of native coronary artery     s/p CABG    DJD (degenerative joint disease)     Fatigue     Headache     High cholesterol     Hypertension     Joint pain     Muscle pain     Neck pain     Nephrolithiasis        Past Surgical History:   Procedure Laterality Date    ARTHROPLASTY,ELBOW,IMPLNT/RECONSTRUC      ECHO 2D ADULT  1/2012    EF50-60%, mild MR    HC DEVICE ABLAT UTER NOVASURE      2007    HX CORONARY ARTERY BYPASS GRAFT  1/2012    LIMA-LAD, SVG-OM1-OM2, SVG-PDA    HX HEART CATHETERIZATION  1/2012    Inf hypo EF 45-50%, LAD 30, 80, 50%; LCx 40%, OM1 90%, RCA 90%,     NM CARDIAC SPECT W STRS/REST MULT  12/2011    NOrmal perfusion, EF 59%       Family History   Problem Relation Age of Onset    Coronary Artery Disease Mother     Diabetes Mother     Hypertension Mother     Heart Disease Maternal Grandmother     Heart Disease Maternal Grandfather        Social History     Social History    Marital status:      Spouse name: N/A    Number of children: N/A    Years of education: N/A     Social History Main Topics    Smoking status: Never Smoker    Smokeless tobacco: Never Used    Alcohol use No      Comment: occasionally    Drug use: None    Sexual activity: Not Asked     Other Topics Concern    None     Social History Narrative       Review of Systems   Eyes: Positive for blurred vision and photophobia. Gastrointestinal: Negative for nausea and vomiting. Neurological: Positive for tingling, sensory change and headaches. Negative for dizziness, tremors, seizures and loss of consciousness. Remainder of comprehensive review is negative. Physical Exam :    Visit Vitals    BP (!) 148/96    Ht 5' 5\" (1.651 m)    Wt 88.5 kg (195 lb)    BMI 32.45 kg/m2             Results for orders placed or performed in visit on 46/59/40   METABOLIC PANEL, COMPREHENSIVE   Result Value Ref Range    Glucose 93 65 - 99 mg/dL    BUN 12 6 - 24 mg/dL    Creatinine 0.64 0.57 - 1.00 mg/dL    GFR est non- >59 mL/min/1.73    GFR est  >59 mL/min/1.73    BUN/Creatinine ratio 19 9 - 23    Sodium 138 134 - 144 mmol/L    Potassium 4.1 3.5 - 5.2 mmol/L    Chloride 100 97 - 108 mmol/L    CO2 23 20 - 32 mmol/L    Calcium 9.4 8.7 - 10.2 mg/dL    Protein, total 6.9 6.0 - 8.5 g/dL    Albumin 4.4 3.5 - 5.5 g/dL    GLOBULIN, TOTAL 2.5 1.5 - 4.5 g/dL    A-G Ratio 1.8 1.1 - 2.5    Bilirubin, total 0.6 0.0 - 1.2 mg/dL    Alk.  phosphatase 74 25 - 150 IU/L    AST (SGOT) 12 0 - 40 IU/L    ALT (SGPT) 20 0 - 40 IU/L   TROPONIN I   Result Value Ref Range    Troponin-I, Qt. <0.31 <0.31 ng/mL   LIPID CASCADE   Result Value Ref Range    Cholesterol, total 198 100 - 199 mg/dL    HDL Cholesterol 41 >39 mg/dL    LDL/HDL Ratio 3.0 0.0 - 3.2 ratio units    Non-HDL Cholesterol 157 (H) 0 - 129 mg/dL    Triglyceride 173 (H) 0 - 149 mg/dL    LDL, calculated 122 (H) 0 - 99 mg/dL   CBC W/O DIFF   Result Value Ref Range    WBC 9.0 4.0 - 10.5 x10E3/uL    RBC 4.32 3.80 - 5.10 x10E6/uL    HGB 12.3 11.5 - 15.0 g/dL    HCT 37.9 34.0 - 44.0 %    MCV 88 80 - 98 fL    MCH 28.5 27.0 - 34.0 pg    MCHC 32.5 32.0 - 36.0 g/dL    RDW 14.4 11.7 - 15.0 %    PLATELET 059 467 - 693 x10E3/uL   TSH, 3RD GENERATION   Result Value Ref Range    TSH 1.860 0.450 - 4.500 uIU/mL   LIPOPROTEIN ANALYSIS, BY NMR   Result Value Ref Range    LDL-P 2115 (H) <1000 nmol/L    HDL-P (Total) 27.1 (L) >=30.5 umol/L    Small LDL-P 1442 (H) <=527 nmol/L    LDL size 20.1 (L) >20.5 nm    LP-IR SCORE 59 (H) <=45       Orders Placed This Encounter    NEURO EEG 24 HR      Standing Status:   Future     Standing Expiration Date:   3/19/2019 Order Specific Question:   Reason for Exam:     Answer:   seizure disorder    amitriptyline (ELAVIL) 25 mg tablet     Sig: Take 1 Tab by mouth nightly. Dispense:  30 Tab     Refill:  5       1. Facial numbness    2. Abnormal involuntary movements    3. Intractable headache, unspecified chronicity pattern, unspecified headache type        Follow-up Disposition:  Return after 24 hour EEG. MRI brain, EEG, doppler, and EMG all normal. We discussed these in full and she has no questions. We discussed a 24 hour EEG needed to try to capture an event on EEG looking at epileptiform. We will try Elavil 25 mg nightly to help with sleep, possible trigeminal neuralgia symptoms on the left side, neck pain, cervicogenic headaches and mood as this can help all. She understands her POC and has no questions about moving forward.        This note will not be viewable in [facility]

## 2018-09-19 NOTE — PATIENT INSTRUCTIONS
[facility]   Statement to Patients  April 1, 2014      In an effort to ensure the large volume of patient prescription refills is processed in the most efficient and expeditious manner, we are asking our patients to assist us by calling your Pharmacy for all prescription refills, this will include also your  Mail Order Pharmacy. The pharmacy will contact our office electronically to continue the refill process. Please do not wait until the last minute to call your pharmacy. We need at least 48 hours (2days) to fill prescriptions. We also encourage you to call your pharmacy before going to  your prescription to make sure it is ready. With regard to controlled substance prescription refill requests (narcotic refills) that need to be picked up at our office, we ask your cooperation by providing us with at least 72 hours (3days) notice that you will need a refill. We will not refill narcotic prescription refill requests after 4:00pm on any weekday, Monday through Thursday, or after 2:00pm on Fridays, or on the weekends. We encourage everyone to explore another way of getting your prescription refill request processed using [employer] our patient web portal through our electronic medical record system. [employer] is an efficient and effective way to communicate your medication request directly to the office and  downloadable as an radha on your smart phone . [employer] also features a review functionality that allows you to view your medication list as well as leave messages for your physician. Are you ready to get connected? If so please review the attatched instructions or speak to any of our staff to get you set up right away! Thank you so much for your cooperation. Should you have any questions please contact our Practice Administrator.     The Physicians and Staff,  [facility]

## 2018-09-19 NOTE — MR AVS SNAPSHOT
[address] [facility] [address] [facility] [id number] [phone] Patient: Roxanna Dueñas MRN: [id number] :1960 Visit Information Date & Time Provider Department Dept. Phone Encounter #  
 2018  9:30 AM Katelynn Kellogg NP [facility] [phone] [id number] Follow-up Instructions Return after 24 hour EEG. Upcoming Health Maintenance Date Due Hepatitis C Screening 1960 DTaP/Tdap/Td series (1 - Tdap) 1982 PAP AKA CERVICAL CYTOLOGY 1982 BREAST CANCER SCRN MAMMOGRAM 2011 FOBT Q 1 YEAR AGE 50-75 2011 Influenza Age 5 to Adult 2018 Allergies as of 2018  Review Complete On: 2018 By: Kenneth Mccann No Known Allergies Current Immunizations  Reviewed on 2011 Name Date Influenza Vaccine Whole 2010 Not reviewed this visit You Were Diagnosed With   
  
 Codes Comments Facial numbness    -  Primary ICD-10-CM: R20.0 ICD-9-CM: 782.0 Abnormal involuntary movements     ICD-10-CM: R25.9 ICD-9-CM: 781.0 Intractable headache, unspecified chronicity pattern, unspecified headache type     ICD-10-CM: R51 ICD-9-CM: 052. 0 Vitals BP Height(growth percentile) Weight(growth percentile) BMI OB Status Smoking Status (!) 148/96 5' 5\" (1.651 m) 195 lb (88.5 kg) 32.45 kg/m2 Postmenopausal Never Smoker BMI and BSA Data Body Mass Index Body Surface Area  
 32.45 kg/m 2 2.01 m 2 Preferred Pharmacy [facility] Name Phone [facility] [address] [facility] [address] [address] Your Updated Medication List  
  
   
This list is accurate as of 18 10:01 AM.  Always use your most recent med list.  
  
  
  
  
 amitriptyline 25 mg tablet Commonly known as:  ELAVIL Take 1 Tab by mouth nightly. aspirin 81 mg tablet Take 81 mg by mouth. BYSTOLIC 5 mg tablet Generic drug:  nebivolol Take 10 mg by mouth daily. CRESTOR 40 mg tablet Generic drug:  rosuvastatin Take 40 mg by mouth nightly.  
  
 gabapentin 600 mg tablet Commonly known as:  NEURONTIN Take  by mouth three (3) times daily. glipiZIDE 5 mg tablet Commonly known as:  Lawrence Meuse Take  by mouth two (2) times a day. isosorbide mononitrate ER 60 mg CR tablet Commonly known as:  IMDUR Take  by mouth every morning. losartan-hydroCHLOROthiazide 100-25 mg per tablet Commonly known as:  HYZAAR Take 1 Tab by mouth daily. metFORMIN 1,000 mg tablet Commonly known as:  GLUCOPHAGE Take 1,000 mg by mouth two (2) times daily (with meals). MICRO-K PO Take  by mouth. REPATHA SURECLICK pen injection Generic drug:  evolocumab  
by SubCUTAneous route. Prescriptions Sent to Pharmacy Refills  
 amitriptyline (ELAVIL) 25 mg tablet 5 Sig: Take 1 Tab by mouth nightly. Class: Normal  
 [facility] [address] [address] [address] [place] [address] Ph #: [phone] Route: Oral  
  
Follow-up Instructions Return after 24 hour EEG. To-Do List   
 09/19/2018 Neurology:  NEURO EEG 24 HR    
  
 09/24/2018 5:30 PM  
  Appointment with DIABETES CLASS #3 [facility] at [facility] ([phone]) Patient Instructions PRESCRIPTION REFILL POLICY [facility] Statement to Patients April 1, 2014 In an effort to ensure the large volume of patient prescription refills is processed in the most efficient and expeditious manner, we are asking our patients to assist us by calling your Pharmacy for all prescription refills, this will include also your  Mail Order Pharmacy. The pharmacy will contact our office electronically to continue the refill process. Please do not wait until the last minute to call your pharmacy.  We need at least 48 hours (2days) to fill prescriptions. We also encourage you to call your pharmacy before going to  your prescription to make sure it is ready. With regard to controlled substance prescription refill requests (narcotic refills) that need to be picked up at our office, we ask your cooperation by providing us with at least 72 hours (3days) notice that you will need a refill. We will not refill narcotic prescription refill requests after 4:00pm on any weekday, Monday through Thursday, or after 2:00pm on Fridays, or on the weekends. We encourage everyone to explore another way of getting your prescription refill request processed using [employer], our patient web portal through our electronic medical record system. [employer] is an efficient and effective way to communicate your medication request directly to the office and  downloadable as an radha on your smart phone . [employer] also features a review functionality that allows you to view your medication list as well as leave messages for your physician. Are you ready to get connected? If so please review the attatched instructions or speak to any of our staff to get you set up right away! Thank you so much for your cooperation. Should you have any questions please contact our Practice Administrator. The Physicians and Staff,  [facility] Introducing [facility]! [facility] introduces [employer] patient portal. Now you can access parts of your medical record, email your doctor's office, and request medication refills online. 1. In your internet browser, go to [email or web address] 2. Click on the First Time User? Click Here link in the Sign In box. You will see the New Member Sign Up page. 3. Enter your [employer] Access Code exactly as it appears below. You will not need to use this code after youve completed the sign-up process.  If you do not sign up before the expiration date, you must request a new code. · [employer] Access Code: [facility] Expires: 10/30/2018  7:30 AM 
 
4. Enter the last four digits of your Social Security Number (xxxx) and Date of Birth (mm/dd/yyyy) as indicated and click Submit. You will be taken to the next sign-up page. 5. Create a [employer] ID. This will be your [employer] login ID and cannot be changed, so think of one that is secure and easy to remember. 6. Create a [employer] password. You can change your password at any time. 7. Enter your Password Reset Question and Answer. This can be used at a later time if you forget your password. 8. Enter your e-mail address. You will receive e-mail notification when new information is available in [address] 9. Click Sign Up. You can now view and download portions of your medical record. 10. Click the Download Summary menu link to download a portable copy of your medical information. If you have questions, please visit the Frequently Asked Questions section of the [employer] website. Remember, [employer] is NOT to be used for urgent needs. For medical emergencies, dial 911. Now available from your iPhone and Android! Please provide this summary of care documentation to your next provider. Your primary care clinician is listed as Gissell Felix. If you have any questions after today's visit, please call [phone].

## 2018-09-20 ENCOUNTER — TELEPHONE (OUTPATIENT)
Dept: NEUROLOGY | Age: 58
End: 2018-09-20

## 2018-09-20 NOTE — TELEPHONE ENCOUNTER
Pt is calling to let the NP know she is having one of those spells that was discussed in appt on 09/19/18. She would like a call back to discuss.  Best contact [phone]

## 2018-09-21 NOTE — TELEPHONE ENCOUNTER
Attempted to contact patient. Left message on voicemail with NP's response    Per NP:      Ok thanks.  Continue current treatment and get the 24 hour EEG   Thanks (Routing comment)

## 2018-10-04 ENCOUNTER — HOSPITAL ENCOUNTER (OUTPATIENT)
Dept: NEUROLOGY | Age: 58
Discharge: HOME | End: 2018-10-04
Attending: NURSE PRACTITIONER
Payer: COMMERCIAL

## 2018-10-04 DIAGNOSIS — R51.9 INTRACTABLE HEADACHE, UNSPECIFIED CHRONICITY PATTERN, UNSPECIFIED HEADACHE TYPE: ICD-10-CM

## 2018-10-04 DIAGNOSIS — R20.0 FACIAL NUMBNESS: ICD-10-CM

## 2018-10-04 DIAGNOSIS — R25.9 ABNORMAL INVOLUNTARY MOVEMENTS: ICD-10-CM

## 2018-10-04 PROCEDURE — 95953 NEURO EEG 24 HR: CPT

## 2018-10-12 ENCOUNTER — TELEPHONE (OUTPATIENT)
Dept: NEUROLOGY | Age: 58
End: 2018-10-12

## 2018-10-12 NOTE — TELEPHONE ENCOUNTER
Pt called to say that she got the 24 EEG device and has turned it back in. She was not sure if a f/u appt was still needed to review. Her best contact [phone].

## 2018-10-12 NOTE — TELEPHONE ENCOUNTER
Attempted to return patient's call. Left message on voicemail for return call. Per NP:  Dr. Matilde Haddad read the 24 hour EEG as normal. No events captured. Still waiting on ANS testing to be approved. If she is still feeling like she is having issues we cna get her into the EMU at [facility] to try to provoke a episode to see what she does and have the brainwave test on at the same time.    This would mean she has to be admitted to the hospital to do this   Thanks (Routing comment)

## 2018-10-12 NOTE — TELEPHONE ENCOUNTER
Patient returned call. She states that she has only had 1 episodes in the past 2 weeks. She cannot place this to a pattern. She is going to consider the EMU and call us back next week.

## 2018-10-17 ENCOUNTER — HOSPITAL ENCOUNTER (OUTPATIENT)
Dept: DIABETES SERVICES | Age: 58
Discharge: HOME | End: 2018-10-17
Attending: FAMILY MEDICINE
Payer: COMMERCIAL

## 2018-10-17 DIAGNOSIS — E11.9 DIABETES MELLITUS WITHOUT COMPLICATION (HCC): ICD-10-CM

## 2018-10-17 PROCEDURE — G0109 DIAB MANAGE TRN IND/GROUP: HCPCS | Performed by: DIETITIAN, REGISTERED

## 2019-05-07 DIAGNOSIS — R51.9 INTRACTABLE HEADACHE, UNSPECIFIED CHRONICITY PATTERN, UNSPECIFIED HEADACHE TYPE: ICD-10-CM

## 2019-05-07 DIAGNOSIS — R25.9 ABNORMAL INVOLUNTARY MOVEMENTS: ICD-10-CM

## 2019-05-07 DIAGNOSIS — R20.0 FACIAL NUMBNESS: ICD-10-CM

## 2019-05-07 RX ORDER — AMITRIPTYLINE HYDROCHLORIDE 25 MG/1
TABLET, FILM COATED ORAL
Qty: 30 TAB | Refills: 5 | Status: SHIPPED | OUTPATIENT
Start: 2019-05-07 | End: 2019-07-17 | Stop reason: SDUPTHER

## 2019-05-09 ENCOUNTER — HOSPITAL ENCOUNTER (OUTPATIENT)
Dept: DIABETES SERVICES | Age: 59
Discharge: HOME | End: 2019-05-09
Attending: FAMILY MEDICINE
Payer: COMMERCIAL

## 2019-05-09 DIAGNOSIS — E11.9 DIABETES MELLITUS WITHOUT COMPLICATION (HCC): ICD-10-CM

## 2019-05-09 PROCEDURE — G0109 DIAB MANAGE TRN IND/GROUP: HCPCS | Performed by: DIETITIAN, REGISTERED

## 2019-07-17 DIAGNOSIS — R51.9 INTRACTABLE HEADACHE, UNSPECIFIED CHRONICITY PATTERN, UNSPECIFIED HEADACHE TYPE: ICD-10-CM

## 2019-07-17 DIAGNOSIS — R25.9 ABNORMAL INVOLUNTARY MOVEMENTS: ICD-10-CM

## 2019-07-17 DIAGNOSIS — R20.0 FACIAL NUMBNESS: ICD-10-CM

## 2019-07-17 RX ORDER — AMITRIPTYLINE HYDROCHLORIDE 25 MG/1
TABLET, FILM COATED ORAL
Qty: 90 TAB | Refills: 1 | Status: SHIPPED | OUTPATIENT
Start: 2019-07-17 | End: 2020-02-19

## 2020-01-13 NOTE — PROGRESS NOTES
Silvina Mathur is a 63 y.o. female here for   Chief Complaint   Patient presents with    New Patient     referred for DM and Thyroid       Functional glucose monitor and record keeping system? - yes  Eye exam within last year? -due   Foot exam within last year? - due    1. Have you been to the ER, urgent care clinic since your last visit? Hospitalized since your last visit? -n/a    2. Have you seen or consulted any other health care providers outside of the [address] [place] [address] since your last visit?   Include any pap smears or colon screening.-n/a

## 2020-01-14 ENCOUNTER — OFFICE VISIT (OUTPATIENT)
Dept: ENDOCRINOLOGY | Age: 60
End: 2020-01-14

## 2020-01-14 VITALS
SYSTOLIC BLOOD PRESSURE: 138 MMHG | BODY MASS INDEX: 32.99 KG/M2 | DIASTOLIC BLOOD PRESSURE: 74 MMHG | WEIGHT: 198 LBS | RESPIRATION RATE: 16 BRPM | HEIGHT: 65 IN | TEMPERATURE: 97.1 F | HEART RATE: 74 BPM | OXYGEN SATURATION: 99 %

## 2020-01-14 DIAGNOSIS — E11.65 TYPE 2 DIABETES MELLITUS WITH HYPERGLYCEMIA, WITH LONG-TERM CURRENT USE OF INSULIN (HCC): Primary | ICD-10-CM

## 2020-01-14 DIAGNOSIS — E78.2 MIXED HYPERLIPIDEMIA: ICD-10-CM

## 2020-01-14 DIAGNOSIS — Z79.4 TYPE 2 DIABETES MELLITUS WITH HYPERGLYCEMIA, WITH LONG-TERM CURRENT USE OF INSULIN (HCC): Primary | ICD-10-CM

## 2020-01-14 DIAGNOSIS — I10 ESSENTIAL HYPERTENSION: ICD-10-CM

## 2020-01-14 DIAGNOSIS — G62.9 POLYNEUROPATHY, UNSPECIFIED: ICD-10-CM

## 2020-01-14 LAB
GLUCOSE POC: 291 MG/DL
HBA1C MFR BLD HPLC: 10.8 %

## 2020-01-14 RX ORDER — PIOGLITAZONEHYDROCHLORIDE 30 MG/1
30 TABLET ORAL DAILY
Qty: 30 TAB | Refills: 11 | Status: SHIPPED | OUTPATIENT
Start: 2020-01-14 | End: 2022-03-29 | Stop reason: ALTCHOICE

## 2020-01-14 RX ORDER — MELATONIN
1000 DAILY
COMMUNITY

## 2020-01-14 RX ORDER — ATORVASTATIN CALCIUM 40 MG/1
40 TABLET, FILM COATED ORAL DAILY
COMMUNITY

## 2020-01-14 NOTE — PROGRESS NOTES
Nahun Roy MD          Patient Information  Date:1/14/2020  Name : Prudencio Muir 63 y.o.     YOB: 1960         Referred by: Petrona Rosales MD         Chief Complaint   Patient presents with   Nolia Stamp New Patient     referred for DM and Thyroid       History of Present Illness: Prudencio Muir is a 63 y.o. female here for initial visit of  Type 2 Diabetes Mellitus. Type 2 Diabetes was diagnosed 2017 . Endorgan effects of diabetes: Peripheral neuropathy  She is on metformin, glipizide, Lantus, Lantus was increased 2 months ago to 50 units  She is checking the blood glucose once daily, they are ranging from 200-300  Last A1C was high and symptoms include  polyuria, polydipsia  Hypoglycemia: no  Breakfast  Eggs , protein shake  Lunch soup and salad  Dinner -sometimes cereal, soup  No sodas   No exercise  Complains of foot pain, fatigue    Symptomatic goiter, status post right thyroidectomy, no thyroid cancer      Weight trend: stable  Prior visit with dietician: yes       Wt Readings from Last 3 Encounters:   01/14/20 198 lb (89.8 kg)   09/19/18 195 lb (88.5 kg)   08/16/18 195 lb 9.6 oz (88.7 kg)       BP Readings from Last 3 Encounters:   01/14/20 138/74   09/19/18 (!) 148/96   08/16/18 138/80           Past Medical History:   Diagnosis Date    Anxiety     Arthritis     Constipation     Coronary atherosclerosis of native coronary artery     s/p CABG    DJD (degenerative joint disease)     Fatigue     Headache     High cholesterol     Hypertension     Joint pain     Muscle pain     Neck pain     Nephrolithiasis      Current Outpatient Medications   Medication Sig    cholecalciferol (VITAMIN D3) (1000 Units /25 mcg) tablet Take 1,000 Units by mouth daily.  insulin glargine,hum.rec.anlog (LANTUS SOLOSTAR U-100 INSULIN SC) 50 Units by SubCUTAneous route nightly.     atorvastatin (LIPITOR) 40 mg tablet Take 40 mg by mouth daily.    losartan-hydroCHLOROthiazide (HYZAAR) 100-25 mg per tablet Take 1 Tab by mouth daily.  evolocumab (REPATHA SURECLICK) pen injection 646 mg by SubCUTAneous route every fourteen (14) days.  metFORMIN (GLUCOPHAGE) 1,000 mg tablet Take 1,000 mg by mouth two (2) times daily (with meals).  isosorbide mononitrate ER (IMDUR) 60 mg CR tablet Take 60 mg by mouth every morning.  gabapentin (NEURONTIN) 600 mg tablet Take  by mouth three (3) times daily.  glipiZIDE (GLUCOTROL) 5 mg tablet Take 5 mg by mouth two (2) times a day.  nebivolol (BYSTOLIC) 10 mg tablet Take 10 mg by mouth daily.  aspirin 81 mg tablet Take 81 mg by mouth daily.  amitriptyline (ELAVIL) 25 mg tablet TAKE 1 TABLET BY MOUTH EVERY DAY AT NIGHT    rosuvastatin (CRESTOR) 40 mg tablet Take 40 mg by mouth nightly.  POTASSIUM CHLORIDE (MICRO-K PO) Take  by mouth. No current facility-administered medications for this visit. No Known Allergies    Review of Systems:  All 10 systems reviewed and are negative other than mentioned in HPI    Physical Examination:   Blood pressure 138/74, pulse 74, temperature 97.1 °F (36.2 °C), temperature source Oral, resp. rate 16, height 5' 5\" (1.651 m), weight 198 lb (89.8 kg), SpO2 99 %. Estimated body mass index is 32.95 kg/m² as calculated from the following:  -   Height as of this encounter: 5' 5\" (1.651 m). -   Weight as of this encounter: 198 lb (89.8 kg).   - General: pleasant, no distress, good eye contact  - HEENT: no pallor, no periorbital edema, EOMI  - Neck: supple, no thyromegaly, no nodules  - Cardiovascular: regular,  normal S1 and S2,  - Respiratory: clear to auscultation bilaterally  - Gastrointestinal: soft, nontender, nondistended,  BS +  - Musculoskeletal: no proximal muscle weakness in upper or lower extremities  - Integumentary: No edema  - Neurological: alert and oriented  - Psychiatric: normal mood and affect  - Skin: color, texture, turgor normal. Diabetic Foot and Eye Exam HM Status   Topic Date Due    Diabetic Foot Care  12/06/1971    Eye Exam  12/06/1971       Diabetic foot exam: January 2020  Left:     Vibratory sensation absent   Filament test decreased sensation with micro filament   Pulse DP: 1+    Deformities: None  Right:    Vibratory sensation absent   Filament test decreased sensation with micro filament   Pulse DP: 1+   Deformities: None        Data Reviewed:       Lab Results   Component Value Date/Time    Glucose 93 03/16/2012 12:00 AM    Glucose (POC) 86 05/11/2011 10:01 PM    Glucose  01/14/2020 03:28 PM    LDL, calculated 122 (H) 03/16/2012 12:00 AM    Creatinine (POC) 0.7 09/29/2011 12:06 PM    Creatinine 0.64 03/16/2012 12:00 AM      Lab Results   Component Value Date/Time    GFR est non- 03/16/2012 12:00 AM    GFRNA, POC >60 09/29/2011 12:06 PM    GFR est  03/16/2012 12:00 AM    GFRAA, POC >60 09/29/2011 12:06 PM    Creatinine 0.64 03/16/2012 12:00 AM    Creatinine (POC) 0.7 09/29/2011 12:06 PM    BUN 12 03/16/2012 12:00 AM    Sodium 138 03/16/2012 12:00 AM    Potassium 4.1 03/16/2012 12:00 AM    Chloride 100 03/16/2012 12:00 AM    CO2 23 03/16/2012 12:00 AM       Assessment/Plan:     1. Type 2 diabetes mellitus with hyperglycemia, with long-term current use of insulin ([facility])        1. Type 2 Diabetes Mellitus   Lab Results   Component Value Date/Time    Hemoglobin A1c (POC) 10.8 01/14/2020 03:28 PM     Uncontrolled diabetes mellitus, diet does not seem to be high in carbohydrates but has severe hyperglycemia, symptomatic  She is on 50 units of Lantus along with glipizide, metformin, reports compliance  Discussed about healthy choices  Discontinue glipizide, start Trulicity, side effects discussed, continue Lantus, metformin.   SGLT2 was expensive in the past  Advised to check glucose 2 - 4 times daily    Diabetic issues reviewed : glycemic goals , written exchange diet given, low carbohydrate diet, weight control , home glucose monitoring emphasized,  hypoglycemia management and long term diabetic complications discussed. FLU annually ,Pneumovax ,aspirin daily,annual eye exam,microalbumin    2. HTN : Continue current therapy     3. Hyperlipidemia : Continue statin. 4.Obesity:Body mass index is 32.95 kg/m². Discussed about the importance of exercise and carbohydrate portion control. 5.  Peripheral neuropathy: Stressed importance of good glycemic control    6. CAD status post CABG    7.  Status post hemithyroidectomy: Clinically euthyroid      There are no Patient Instructions on file for this visit. Thank you for allowing me to participate in the care of this patient. Emil Blanchard MD      Patient verbalized understanding     Voice-recognition software was used to generate this report, which may result in some phonetic-based errors in the grammar and contents. Even though attempts were made to correct all the mistakes, some may have been missed and remained in the body of the report.

## 2020-02-19 ENCOUNTER — OFFICE VISIT (OUTPATIENT)
Dept: ENDOCRINOLOGY | Age: 60
End: 2020-02-19

## 2020-02-19 VITALS
TEMPERATURE: 97.9 F | BODY MASS INDEX: 33.2 KG/M2 | DIASTOLIC BLOOD PRESSURE: 75 MMHG | WEIGHT: 199.3 LBS | OXYGEN SATURATION: 96 % | RESPIRATION RATE: 14 BRPM | SYSTOLIC BLOOD PRESSURE: 124 MMHG | HEIGHT: 65 IN | HEART RATE: 77 BPM

## 2020-02-19 DIAGNOSIS — Z79.4 TYPE 2 DIABETES MELLITUS WITH HYPERGLYCEMIA, WITH LONG-TERM CURRENT USE OF INSULIN (HCC): ICD-10-CM

## 2020-02-19 DIAGNOSIS — E11.65 TYPE 2 DIABETES MELLITUS WITH HYPERGLYCEMIA, WITH LONG-TERM CURRENT USE OF INSULIN (HCC): Primary | ICD-10-CM

## 2020-02-19 DIAGNOSIS — E11.65 TYPE 2 DIABETES MELLITUS WITH HYPERGLYCEMIA, WITH LONG-TERM CURRENT USE OF INSULIN (HCC): ICD-10-CM

## 2020-02-19 DIAGNOSIS — Z79.4 TYPE 2 DIABETES MELLITUS WITH HYPERGLYCEMIA, WITH LONG-TERM CURRENT USE OF INSULIN (HCC): Primary | ICD-10-CM

## 2020-02-19 DIAGNOSIS — E78.2 MIXED HYPERLIPIDEMIA: ICD-10-CM

## 2020-02-19 DIAGNOSIS — I10 ESSENTIAL HYPERTENSION, BENIGN: ICD-10-CM

## 2020-02-19 RX ORDER — INSULIN GLARGINE 100 [IU]/ML
INJECTION, SOLUTION SUBCUTANEOUS
Qty: 60 ML | Refills: 3 | Status: SHIPPED | OUTPATIENT
Start: 2020-02-19

## 2020-02-19 NOTE — PROGRESS NOTES
Zain Thomas MD          Patient Information  Date:2/19/2020  Name : Samuel Hastings 63 y.o.     YOB: 1960         Referred by: Danielle Do MD         Chief Complaint   Patient presents with    Diabetes       History of Present Illness: Samuel Hastinsg is a 63 y.o. female here for follow-up of  Type 2 Diabetes Mellitus. Type 2 Diabetes was diagnosed 2017 . Endorgan effects of diabetes: Peripheral neuropathy  She is on Lantus, metformin, Actos  Tolerating Trulicity    She is checking the blood glucose once daily, they are ranging from 150-180, noted improvement  No polyuria, polydipsia  Hypoglycemia: no  Breakfast  Eggs , protein shake  Lunch soup and salad  Dinner -sometimes cereal, soup    Complains of foot pain, fatigue    Symptomatic goiter, status post right thyroidectomy, no thyroid cancer          Wt Readings from Last 3 Encounters:   02/19/20 199 lb 4.8 oz (90.4 kg)   01/14/20 198 lb (89.8 kg)   09/19/18 195 lb (88.5 kg)       BP Readings from Last 3 Encounters:   02/19/20 124/75   01/14/20 138/74   09/19/18 (!) 148/96           Past Medical History:   Diagnosis Date    Anxiety     Arthritis     Constipation     Coronary atherosclerosis of native coronary artery     s/p CABG    DJD (degenerative joint disease)     Fatigue     Headache     High cholesterol     Hypertension     Joint pain     Muscle pain     Neck pain     Nephrolithiasis      Current Outpatient Medications   Medication Sig    cholecalciferol (VITAMIN D3) (1000 Units /25 mcg) tablet Take 1,000 Units by mouth daily.  insulin glargine,hum.rec.anlog (LANTUS SOLOSTAR U-100 INSULIN SC) 50 Units by SubCUTAneous route nightly.  atorvastatin (LIPITOR) 40 mg tablet Take 40 mg by mouth daily.  dulaglutide (TRULICITY) 8.67 CI/3.0 mL sub-q pen 0.5 mL by SubCUTAneous route every seven (7) days.     pioglitazone (ACTOS) 30 mg tablet Take 1 Tab by mouth daily.  losartan-hydroCHLOROthiazide (HYZAAR) 100-25 mg per tablet Take 1 Tab by mouth daily.  evolocumab (REPATHA SURECLICK) pen injection 254 mg by SubCUTAneous route every fourteen (14) days.  metFORMIN (GLUCOPHAGE) 1,000 mg tablet Take 1,000 mg by mouth two (2) times daily (with meals).  isosorbide mononitrate ER (IMDUR) 60 mg CR tablet Take 60 mg by mouth every morning.  nebivolol (BYSTOLIC) 10 mg tablet Take 10 mg by mouth daily.  aspirin 81 mg tablet Take 81 mg by mouth daily.  amitriptyline (ELAVIL) 25 mg tablet TAKE 1 TABLET BY MOUTH EVERY DAY AT NIGHT    gabapentin (NEURONTIN) 600 mg tablet Take  by mouth three (3) times daily.  rosuvastatin (CRESTOR) 40 mg tablet Take 40 mg by mouth nightly.  POTASSIUM CHLORIDE (MICRO-K PO) Take  by mouth. No current facility-administered medications for this visit. No Known Allergies    Review of Systems:  All 10 systems reviewed and are negative other than mentioned in HPI    Physical Examination:   Blood pressure 124/75, pulse 77, temperature 97.9 °F (36.6 °C), temperature source Oral, resp. rate 14, height 5' 5\" (1.651 m), weight 199 lb 4.8 oz (90.4 kg), SpO2 96 %. Estimated body mass index is 33.17 kg/m² as calculated from the following:    Height as of this encounter: 5' 5\" (1.651 m). -   Weight as of this encounter: 199 lb 4.8 oz (90.4 kg).   - General: pleasant, no distress, good eye contact  - HEENT: no pallor, no periorbital edema, EOMI  - Neck: supple,  - Cardiovascular: regular,  normal S1 and S2,  - Respiratory: clear to auscultation bilaterally  - Gastrointestinal: soft, nontender, nondistended,  BS +  - Musculoskeletal: no proximal muscle weakness in upper or lower extremities  - Integumentary: No edema  - Neurological: alert and oriented  - Psychiatric: normal mood and affect  - Skin: color, texture, turgor normal.     Diabetic Foot and Eye Exam HM Status   Topic Date Due    Diabetic Foot Care  12/06/1971    Eye Exam  12/06/1971       Diabetic foot exam: January 2020  Left:     Vibratory sensation absent   Filament test decreased sensation with micro filament   Pulse DP: 1+    Deformities: None  Right:    Vibratory sensation absent   Filament test decreased sensation with micro filament   Pulse DP: 1+   Deformities: None        Data Reviewed:       Lab Results   Component Value Date/Time    Glucose 93 03/16/2012 12:00 AM    Glucose (POC) 86 05/11/2011 10:01 PM    Glucose  01/14/2020 03:28 PM    LDL, calculated 122 (H) 03/16/2012 12:00 AM    Creatinine (POC) 0.7 09/29/2011 12:06 PM    Creatinine 0.64 03/16/2012 12:00 AM      Lab Results   Component Value Date/Time    GFR est non- 03/16/2012 12:00 AM    GFRNA, POC >60 09/29/2011 12:06 PM    GFR est  03/16/2012 12:00 AM    GFRAA, POC >60 09/29/2011 12:06 PM    Creatinine 0.64 03/16/2012 12:00 AM    Creatinine (POC) 0.7 09/29/2011 12:06 PM    BUN 12 03/16/2012 12:00 AM    Sodium 138 03/16/2012 12:00 AM    Potassium 4.1 03/16/2012 12:00 AM    Chloride 100 03/16/2012 12:00 AM    CO2 23 03/16/2012 12:00 AM       Assessment/Plan:     1. Type 2 diabetes mellitus with hyperglycemia, with long-term current use of insulin ([address]    2. Essential hypertension, benign    3. Mixed hyperlipidemia        1. Type 2 Diabetes Mellitus   Lab Results   Component Value Date/Time    Hemoglobin A1c (POC) 10.8 01/14/2020 03:28 PM     Improved control based on the blood glucose  She forgot to bring the meter  Lantus 60 units, increase Trulicity after a month to 1.5 mg  Discussed about healthy choices   SGLT2 was expensive in the past  Advised to check glucose 2 - 4 times daily        2. HTN : Continue current therapy     3. Hyperlipidemia : Continue statin. 4.Obesity:Body mass index is 33.17 kg/m². Discussed about the importance of exercise and carbohydrate portion control. 5.  Peripheral neuropathy: Stressed importance of good glycemic control    6.   CAD status post CABG    7.  Status post hemithyroidectomy: Clinically euthyroid      There are no Patient Instructions on file for this visit. Thank you for allowing me to participate in the care of this patient. Jake Cook MD      Patient verbalized understanding     Voice-recognition software was used to generate this report, which may result in some phonetic-based errors in the grammar and contents. Even though attempts were made to correct all the mistakes, some may have been missed and remained in the body of the report.

## 2020-02-19 NOTE — LETTER
2/19/20 Patient: Dany Shanks YOB: 1960 Date of Visit: 2/19/2020 Rico Miguel, [address] [facility] [address] [facility] [address] VIA Facsimile: [phone] Dear Rico Miguel MD, Thank you for referring Ms. Delilah Ryder to [address] [address] for evaluation. My notes for this consultation are attached. If you have questions, please do not hesitate to call me. I look forward to following your patient along with you. Sincerely, Valerie Lopez MD

## 2020-02-19 NOTE — PROGRESS NOTES
Noemy Chavira is a 63 y.o. female here for   Chief Complaint   Patient presents with    Diabetes       1. Have you been to the ER, urgent care clinic since your last visit? Hospitalized since your last visit? -    2. Have you seen or consulted any other health care providers outside of the [address] [place] [address] since your last visit?   Include any pap smears or colon screening.-

## 2020-03-17 ENCOUNTER — OP HISTORICAL/CONVERTED ENCOUNTER (OUTPATIENT)
Dept: OTHER | Age: 60
End: 2020-03-17

## 2020-04-15 ENCOUNTER — TELEPHONE (OUTPATIENT)
Dept: ENDOCRINOLOGY | Age: 60
End: 2020-04-15

## 2020-04-15 DIAGNOSIS — E11.65 TYPE 2 DIABETES MELLITUS WITH HYPERGLYCEMIA, WITH LONG-TERM CURRENT USE OF INSULIN (HCC): Primary | ICD-10-CM

## 2020-04-15 DIAGNOSIS — I10 ESSENTIAL HYPERTENSION: ICD-10-CM

## 2020-04-15 DIAGNOSIS — E78.2 MIXED HYPERLIPIDEMIA: ICD-10-CM

## 2020-04-15 DIAGNOSIS — Z79.4 TYPE 2 DIABETES MELLITUS WITH HYPERGLYCEMIA, WITH LONG-TERM CURRENT USE OF INSULIN (HCC): Primary | ICD-10-CM

## 2020-04-15 NOTE — TELEPHONE ENCOUNTER
Order placed for pt per verbal order with read back from Dr. Pressley Diver 04/15/20    Lab slips mailed

## 2020-05-20 ENCOUNTER — VIRTUAL VISIT (OUTPATIENT)
Dept: ENDOCRINOLOGY | Age: 60
End: 2020-05-20

## 2020-05-20 DIAGNOSIS — E78.2 MIXED HYPERLIPIDEMIA: ICD-10-CM

## 2020-05-20 DIAGNOSIS — Z79.4 TYPE 2 DIABETES MELLITUS WITH HYPERGLYCEMIA, WITH LONG-TERM CURRENT USE OF INSULIN (HCC): Primary | ICD-10-CM

## 2020-05-20 DIAGNOSIS — I10 ESSENTIAL HYPERTENSION: ICD-10-CM

## 2020-05-20 DIAGNOSIS — E11.65 TYPE 2 DIABETES MELLITUS WITH HYPERGLYCEMIA, WITH LONG-TERM CURRENT USE OF INSULIN (HCC): Primary | ICD-10-CM

## 2020-05-20 DIAGNOSIS — G62.9 POLYNEUROPATHY, UNSPECIFIED: ICD-10-CM

## 2020-05-20 NOTE — PROGRESS NOTES
Patricia Nguyen is a 63 y.o. female here for   Chief Complaint   Patient presents with    Diabetes     Pt consented to virtual visit. 1. Have you been to the ER, urgent care clinic since your last visit? Hospitalized since your last visit? -[facility] 3/6/20 for bronchitis    2. Have you seen or consulted any other health care providers outside of the [address] [place] [address] since your last visit? Include any pap smears or colon screening. -PCP [facility] and dermatology for cyst under arm    Order placed for pt per verbal order with read back from Dr. Minal Pichardo 05/20/20

## 2020-05-20 NOTE — PROGRESS NOTES
Sallie Archuleta MD          Patient Information  Date:5/20/2020  Name : Saint Shed 63 y.o.     YOB: 1960         Referred by: Vernon Allen MD     Pursuant to the emergency declaration under the [facility], [address] waiver authority and the Coronavirus Preparedness and [employer] Act, this Virtual  Visit was conducted, with patient's consent, to reduce the patient's risk of exposure to COVID-19 . Patient  is aware that this is a billable encounter and is responsible for copays/deductibles       Services were provided through a video synchronous discussion virtually to substitute for in-person clinic visit. Place of service: Provider : Office  Patient: Home    Chief Complaint   Patient presents with    Diabetes       History of Present Illness: Saint Shed is a 63 y.o. female here for follow-up of  Type 2 Diabetes Mellitus. Type 2 Diabetes was diagnosed 2017 . Endorgan effects of diabetes: Peripheral neuropathy  She is on Lantus, metformin, Actos  Tolerating Trulicity, I had sent 1.5 mg of Trulicity to the pharmacy however she is still getting 0.75 mg.   Pharmacy did receive 1.5 mg prescription, discussed with the pharmacist  Patient to go back to the pharmacy and exchange the prescription  Lost her dad recently, not able to concentrate on her diet    Checking twice a day   fasting 142,217,115,169,142  Bedtime 196          No polyuria, polydipsia  Hypoglycemia: no  Breakfast  Eggs , protein shake  Lunch soup and salad  Dinner -sometimes cereal, soup      Symptomatic goiter, status post right thyroidectomy, no thyroid cancer          Wt Readings from Last 3 Encounters:   02/19/20 199 lb 4.8 oz (90.4 kg)   01/14/20 198 lb (89.8 kg)   09/19/18 195 lb (88.5 kg)       BP Readings from Last 3 Encounters:   02/19/20 124/75   01/14/20 138/74   09/19/18 (!) 148/96           Past Medical History:   Diagnosis Date    Anxiety     Arthritis     Constipation     Coronary atherosclerosis of native coronary artery     s/p CABG    DJD (degenerative joint disease)     Fatigue     Headache     High cholesterol     Hypertension     Joint pain     Muscle pain     Neck pain     Nephrolithiasis      Current Outpatient Medications   Medication Sig    dulaglutide (TRULICITY) 1.5 SI/6.0 mL sub-q pen 0.5 mL by SubCUTAneous route every seven (7) days. Stop 0.75 mg    insulin glargine (LANTUS SOLOSTAR U-100 INSULIN) 100 unit/mL (3 mL) inpn Inject 60 units daily    cholecalciferol (VITAMIN D3) (1000 Units /25 mcg) tablet Take 1,000 Units by mouth daily.  atorvastatin (LIPITOR) 40 mg tablet Take 40 mg by mouth daily.  pioglitazone (ACTOS) 30 mg tablet Take 1 Tab by mouth daily.  losartan-hydroCHLOROthiazide (HYZAAR) 100-25 mg per tablet Take 1 Tab by mouth daily.  evolocumab (REPATHA SURECLICK) pen injection 986 mg by SubCUTAneous route every fourteen (14) days.  metFORMIN (GLUCOPHAGE) 1,000 mg tablet Take 1,000 mg by mouth two (2) times daily (with meals).  isosorbide mononitrate ER (IMDUR) 60 mg CR tablet Take 60 mg by mouth every morning.  nebivolol (BYSTOLIC) 10 mg tablet Take 10 mg by mouth daily.  aspirin 81 mg tablet Take 81 mg by mouth daily. No current facility-administered medications for this visit. No Known Allergies    Review of Systems:    Physical Examination:   There were no vitals taken for this visit. Estimated body mass index is 33.17 kg/m² as calculated from the following:    Height as of 2/19/20: 5' 5\" (1.651 m). -   Weight as of 2/19/20: 199 lb 4.8 oz (90.4 kg).   -    General: pleasant, no distress, good eye contact  HEENT: no exophthalmos, no periorbital edema, EOMI  Neck: No visible thyromegaly  RS: Normal respiratory effort  Musculoskeletal: no tremors  Neurological: alert and oriented  Psychiatric: normal mood and affect  Skin: Normal color  Diabetic Foot and Eye Exam HM Status   Topic Date Due    Diabetic Foot Care  12/06/1971    Eye Exam  12/06/1971       Diabetic foot exam: January 2020  Left:     Vibratory sensation absent   Filament test decreased sensation with micro filament   Pulse DP: 1+    Deformities: None  Right:    Vibratory sensation absent   Filament test decreased sensation with micro filament   Pulse DP: 1+   Deformities: None        Data Reviewed:       Lab Results   Component Value Date/Time    Glucose 93 03/16/2012 12:00 AM    Glucose (POC) 86 05/11/2011 10:01 PM    Glucose  01/14/2020 03:28 PM    LDL, calculated 122 (H) 03/16/2012 12:00 AM    Creatinine (POC) 0.7 09/29/2011 12:06 PM    Creatinine 0.64 03/16/2012 12:00 AM      Lab Results   Component Value Date/Time    GFR est non- 03/16/2012 12:00 AM    GFRNA, POC >60 09/29/2011 12:06 PM    GFR est  03/16/2012 12:00 AM    GFRAA, POC >60 09/29/2011 12:06 PM    Creatinine 0.64 03/16/2012 12:00 AM    Creatinine (POC) 0.7 09/29/2011 12:06 PM    BUN 12 03/16/2012 12:00 AM    Sodium 138 03/16/2012 12:00 AM    Potassium 4.1 03/16/2012 12:00 AM    Chloride 100 03/16/2012 12:00 AM    CO2 23 03/16/2012 12:00 AM       Assessment/Plan:     No diagnosis found. 1. Type 2 Diabetes Mellitus   Lab Results   Component Value Date/Time    Hemoglobin A1c (POC) 10.8 01/14/2020 03:28 PM   Improvement in the blood glucose based on the SM BG      Lantus 60 units, increase Trulicity 1.5 mg     SGLT2 was expensive in the past  Advised to check glucose 2 - 4 times daily        2. HTN : Continue current therapy     3. Hyperlipidemia : Continue statin. 4.Obesity:There is no height or weight on file to calculate BMI. Discussed about the importance of exercise and carbohydrate portion control. 5.  Peripheral neuropathy: Stressed importance of good glycemic control    6.   CAD status post CABG    7.  Status post hemithyroidectomy: Clinically euthyroid      There are no Patient Instructions on file for this visit. Thank you for allowing me to participate in the care of this patient. Consuelo Morin MD      Patient verbalized understanding     Voice-recognition software was used to generate this report, which may result in some phonetic-based errors in the grammar and contents. Even though attempts were made to correct all the mistakes, some may have been missed and remained in the body of the report.

## 2020-09-14 DIAGNOSIS — Z79.4 TYPE 2 DIABETES MELLITUS WITH HYPERGLYCEMIA, WITH LONG-TERM CURRENT USE OF INSULIN (HCC): ICD-10-CM

## 2020-09-14 DIAGNOSIS — E78.2 MIXED HYPERLIPIDEMIA: ICD-10-CM

## 2020-09-14 DIAGNOSIS — E11.65 TYPE 2 DIABETES MELLITUS WITH HYPERGLYCEMIA, WITH LONG-TERM CURRENT USE OF INSULIN (HCC): ICD-10-CM

## 2020-09-14 DIAGNOSIS — I10 ESSENTIAL HYPERTENSION: ICD-10-CM

## 2021-05-10 DIAGNOSIS — E11.65 TYPE 2 DIABETES MELLITUS WITH HYPERGLYCEMIA, WITH LONG-TERM CURRENT USE OF INSULIN (HCC): ICD-10-CM

## 2021-05-10 DIAGNOSIS — Z79.4 TYPE 2 DIABETES MELLITUS WITH HYPERGLYCEMIA, WITH LONG-TERM CURRENT USE OF INSULIN (HCC): ICD-10-CM

## 2021-05-12 RX ORDER — DULAGLUTIDE 1.5 MG/.5ML
INJECTION, SOLUTION SUBCUTANEOUS
Qty: 4 SYRINGE | Refills: 2 | Status: SHIPPED | OUTPATIENT
Start: 2021-05-12 | End: 2021-08-08

## 2021-08-08 DIAGNOSIS — E11.65 TYPE 2 DIABETES MELLITUS WITH HYPERGLYCEMIA, WITH LONG-TERM CURRENT USE OF INSULIN (HCC): ICD-10-CM

## 2021-08-08 DIAGNOSIS — Z79.4 TYPE 2 DIABETES MELLITUS WITH HYPERGLYCEMIA, WITH LONG-TERM CURRENT USE OF INSULIN (HCC): ICD-10-CM

## 2021-08-08 RX ORDER — DULAGLUTIDE 1.5 MG/.5ML
INJECTION, SOLUTION SUBCUTANEOUS
Qty: 2 SYRINGE | Refills: 2 | Status: SHIPPED | OUTPATIENT
Start: 2021-08-08 | End: 2022-03-29 | Stop reason: ALTCHOICE

## 2021-09-29 ENCOUNTER — PATIENT MESSAGE (OUTPATIENT)
Dept: NEUROLOGY | Age: 61
End: 2021-09-29

## 2022-03-18 PROBLEM — G62.9 POLYNEUROPATHY, UNSPECIFIED: Status: ACTIVE | Noted: 2020-01-14

## 2022-03-19 PROBLEM — I10 ESSENTIAL HYPERTENSION: Status: ACTIVE | Noted: 2020-01-14

## 2022-03-19 PROBLEM — Z79.4 TYPE 2 DIABETES MELLITUS WITH HYPERGLYCEMIA, WITH LONG-TERM CURRENT USE OF INSULIN (HCC): Status: ACTIVE | Noted: 2020-01-14

## 2022-03-19 PROBLEM — E11.65 TYPE 2 DIABETES MELLITUS WITH HYPERGLYCEMIA, WITH LONG-TERM CURRENT USE OF INSULIN (HCC): Status: ACTIVE | Noted: 2020-01-14

## 2022-03-19 PROBLEM — E78.2 MIXED HYPERLIPIDEMIA: Status: ACTIVE | Noted: 2020-01-14

## 2022-03-29 ENCOUNTER — OFFICE VISIT (OUTPATIENT)
Dept: NEUROLOGY | Age: 62
End: 2022-03-29
Payer: COMMERCIAL

## 2022-03-29 VITALS
SYSTOLIC BLOOD PRESSURE: 146 MMHG | HEART RATE: 76 BPM | HEIGHT: 65 IN | TEMPERATURE: 96.5 F | OXYGEN SATURATION: 99 % | BODY MASS INDEX: 34.16 KG/M2 | DIASTOLIC BLOOD PRESSURE: 100 MMHG | RESPIRATION RATE: 16 BRPM | WEIGHT: 205 LBS

## 2022-03-29 DIAGNOSIS — M79.10 MUSCLE PAIN: Primary | ICD-10-CM

## 2022-03-29 DIAGNOSIS — G43.711 INTRACTABLE CHRONIC MIGRAINE WITHOUT AURA AND WITH STATUS MIGRAINOSUS: ICD-10-CM

## 2022-03-29 DIAGNOSIS — R51.9 NEW ONSET OF HEADACHES AFTER AGE 50: ICD-10-CM

## 2022-03-29 PROCEDURE — 99204 OFFICE O/P NEW MOD 45 MIN: CPT | Performed by: PSYCHIATRY & NEUROLOGY

## 2022-03-29 RX ORDER — SUMATRIPTAN 50 MG/1
TABLET, FILM COATED ORAL
COMMUNITY
Start: 2022-03-25 | End: 2022-03-29 | Stop reason: SINTOL

## 2022-03-29 RX ORDER — AMLODIPINE BESYLATE 5 MG/1
TABLET ORAL
COMMUNITY

## 2022-03-29 RX ORDER — SEMAGLUTIDE 1.34 MG/ML
INJECTION, SOLUTION SUBCUTANEOUS
COMMUNITY

## 2022-03-29 RX ORDER — RIMEGEPANT SULFATE 75 MG/75MG
TABLET, ORALLY DISINTEGRATING ORAL
Qty: 16 TABLET | Refills: 3 | Status: SHIPPED | OUTPATIENT
Start: 2022-03-29

## 2022-03-29 NOTE — PATIENT INSTRUCTIONS
RESULT POLICY      If we have ordered testing for you, know that; \"NO NEWS IS GOOD NEWS! \"     It is our policy that we no longer call patients with results, nor do we  give test results over the phone. We schedule follow up appointments so that your results can be discussed in person. This allows you to address any questions you have regarding the results. If you choose to go to an imaging center outside of [facility], it is your responsibility to bring imaging report and disc to follow up appointment. If something of concern is revealed on your test, we will contact you to discuss the matter and if needed schedule a sooner follow up appointment. Additionally, results may be found by using the My Chart feature and one of our patient service representatives at the  can give you instructions on how to access this feature to utilize our electronic medical record system. Thank you for your understanding. [facility]   Statement to Patients  April 1, 2014      In an effort to ensure the large volume of patient prescription refills is processed in the most efficient and expeditious manner, we are asking our patients to assist us by calling your Pharmacy for all prescription refills, this will include also your  Mail Order Pharmacy. The pharmacy will contact our office electronically to continue the refill process. Please do not wait until the last minute to call your pharmacy. We need at least 48 hours (2days) to fill prescriptions. We also encourage you to call your pharmacy before going to  your prescription to make sure it is ready. With regard to controlled substance prescription refill requests (narcotic refills) that need to be picked up at our office, we ask your cooperation by providing us with at least 72 hours (3days) notice that you will need a refill.     We will not refill narcotic prescription refill requests after 4:00pm on any weekday, Monday through Thursday, or after 2:00pm on Fridays, or on the weekends. We encourage everyone to explore another way of getting your prescription refill request processed using [employer], our patient web portal through our electronic medical record system. [employer] is an efficient and effective way to communicate your medication request directly to the office and  downloadable as an radha on your smart phone . [employer] also features a review functionality that allows you to view your medication list as well as leave messages for your physician. Are you ready to get connected? If so please review the attatched instructions or speak to any of our staff to get you set up right away! Thank you so much for your cooperation. Should you have any questions please contact our Practice Administrator.

## 2022-03-29 NOTE — PROGRESS NOTES
[facility] and [address] at [facility] at [address] [facility], [address] [place] [address] [address] [facility] [address]  [phone] Office  [phone]           Referring: No referring provider defined for this encounter. Chief Complaint   Patient presents with    New Patient    Headache     PMH of headaches but these are more migrainous causing expreme pain on left side // sumatriptan is helping some. still there but not as extreme   52-year-old lady who presents today for neurologic consultation regarding new headache. She has history of headaches but has not had headache like this before. About 7 or 8 weeks ago she started to have left-sided headache with associated photophobia and phonophobia. Has had some nausea in the last week. Unremitting. Over-the-counter medicines did not help. She was given Imitrex and she used it 3 times this past weekend and it made her feel poorly with side effects of chest discomfort. She does have coronary artery disease. Had a recent visit with her cardiologist and she has some congestive heart failure so she was put on a diuretic. She has also been having pain in her feet seeing podiatry. Has a lot of pain in her ankles. She also has multifocal muscle pain without rhyme or reason. No focal weakness although her ankles feel weak. Record review finds I saw the patient back in 2018 for facial pain and left-sided numbness. Her examination was unremarkable  She had several tests performed  MRI of the brain normal  Carotid Doppler with no significant stenosis  EEG normal  24-hour EEG  EMG of the lower extremities normal    Endocrinology note from May 20, 2020 finds type 2 diabetes with a hemoglobin A1c of 10.8.   Past Medical History:   Diagnosis Date    Anxiety     Arthritis     Constipation     Coronary atherosclerosis of native coronary artery     s/p CABG    Diabetes ([facility] [address]     DJD (degenerative joint disease)     Fatigue     Headache     High cholesterol     Hypertension     Joint pain     Migraine     Muscle pain     Neck pain     Nephrolithiasis        Past Surgical History:   Procedure Laterality Date    ECHO 2D ADULT  1/2012    EF50-60%, mild MR    HC DEVICE ABLAT UTER NOVASURE      2007    HX CORONARY ARTERY BYPASS GRAFT  1/2012    LIMA-LAD, SVG-OM1-OM2, SVG-PDA    HX HEART CATHETERIZATION  1/2012    Inf hypo EF 45-50%, LAD 30, 80, 50%; LCx 40%, OM1 90%, RCA 90%,     HX MALIGNANT SKIN LESION EXCISION Left 07/24/2017    upper abdomen    HX OPEN REDUCTION INTERNAL FIXATION Left 1998    forearm    HX PARTIAL THYROIDECTOMY Right 08/31/2015    HX TOTAL ABDOMINAL HYSTERECTOMY  09/2016    NM CARDIAC SPECT W STRS/REST MULT  12/2011    NOrmal perfusion, EF 59%    SC ARTHROPLASTY,ELBOW,IMPLNT/RECONSTRUC         Current Outpatient Medications   Medication Sig Dispense Refill    SUMAtriptan (IMITREX) 50 mg tablet 0.5 TABLET TO 1 TABLET AS NEEDED AT ONSET OF HEADACHE ORALLY ONCE A DAY 30 DAYS      amLODIPine (NORVASC) 5 mg tablet 1 tablet      semaglutide (Ozempic) 0.25 mg or 0.5 mg/dose (2 mg/1.5 ml) subq pen by SubCUTAneous route every seven (7) days.  insulin glargine (LANTUS SOLOSTAR U-100 INSULIN) 100 unit/mL (3 mL) inpn Inject 60 units daily 60 mL 3    cholecalciferol (VITAMIN D3) (1000 Units /25 mcg) tablet Take 1,000 Units by mouth daily.  atorvastatin (LIPITOR) 40 mg tablet Take 40 mg by mouth daily.  losartan-hydroCHLOROthiazide (HYZAAR) 100-25 mg per tablet Take 1 Tab by mouth daily.  evolocumab (REPATHA SURECLICK) pen injection 919 mg by SubCUTAneous route every fourteen (14) days.  isosorbide mononitrate ER (IMDUR) 60 mg CR tablet Take 60 mg by mouth every morning.  nebivolol (BYSTOLIC) 10 mg tablet Take 10 mg by mouth daily.  aspirin 81 mg tablet Take 81 mg by mouth daily. No Known Allergies    Social History     Tobacco Use    Smoking status: Never Smoker    Smokeless tobacco: Never Used   Vaping Use    Vaping Use: Never used   Substance Use Topics    Alcohol use: Yes     Comment: occasionally    Drug use: Not Currently       Family History   Problem Relation Age of Onset    Coronary Art Dis Mother     Diabetes Mother     Hypertension Mother     COPD Mother     Heart Disease Maternal Grandmother     Heart Disease Maternal Grandfather     Thyroid Cancer Father     COPD Father     Lung Cancer Father        Review of Systems  Pertinent positives and negatives as noted. Examination  There were no vitals taken for this visit. Visit Vitals  BP (!) 160/94 (BP 1 Location: Right arm, BP Patient Position: Sitting, BP Cuff Size: Adult)   Pulse 76   Temp (!) 96.5 °F (35.8 °C)   Resp 16   Ht 5' 4.5\" (1.638 m)   Wt 93 kg (205 lb)   SpO2 99%   BMI 34.64 kg/m²     Neurologically, she is awake, alert, and oriented with normal speech and language. Her cognition is normal.    Intact cranial nerves 2-12. No nystagmus. Disk margins are flat bilaterally. She has normal bulk and tone. She has no abnormal movement. She has no pronation or drift. She generates full strength in the upper and lower extremities to direct confrontational testing. Reflexes are symmetrical in the upper and lower extremities bilaterally. No pathologic reflexes are elicited. Finger nose finger and rapid alternating movements are normal.  Steady gait.       Impression/Plan  81-year-old lady with change in headache frequency now with status migrainosus ongoing now for about 8 weeks  Side effects to Imitrex  History of coronary artery disease which makes triptan medications contraindicated  Start Nurtec 75 mg every other day  Sedimentation rate given the change in headaches in a 62year-old lady    Muscle aches and diffuse pains  Check CK as well as connective tissue markers    Follow-up in about 8 ion Thomas MD          This note was created using voice recognition software. Despite editing, there may be syntax errors.

## 2022-04-02 LAB
CENTROMERE B AB SER-ACNC: <0.2 AI (ref 0–0.9)
CHROMATIN AB SERPL-ACNC: <0.2 AI (ref 0–0.9)
CK SERPL-CCNC: 51 U/L (ref 32–182)
DSDNA AB SER-ACNC: <1 IU/ML (ref 0–9)
ENA JO1 AB SER-ACNC: <0.2 AI (ref 0–0.9)
ENA RNP AB SER-ACNC: <0.2 AI (ref 0–0.9)
ENA SCL70 AB SER-ACNC: <0.2 AI (ref 0–0.9)
ENA SM AB SER-ACNC: <0.2 AI (ref 0–0.9)
ENA SS-A AB SER-ACNC: <0.2 AI (ref 0–0.9)
ENA SS-B AB SER-ACNC: <0.2 AI (ref 0–0.9)
ERYTHROCYTE [SEDIMENTATION RATE] IN BLOOD BY WESTERGREN METHOD: 2 MM/HR (ref 0–40)
SEE BELOW, 164869: NORMAL

## 2022-04-21 ENCOUNTER — TELEPHONE (OUTPATIENT)
Dept: NEUROLOGY | Age: 62
End: 2022-04-21

## 2022-04-21 NOTE — TELEPHONE ENCOUNTER
Re: [facility] PA request from [employer]. Initiated PA in [facility], key# [id number]. PA approved via CM. Awaiting letter.   -Case SY#[id number]  -Approved: 04/21/22-07/20/22. Sent nurse and pt mychart msg w/ update. Will update [employer] via spreadsheet.

## 2022-07-20 ENCOUNTER — APPOINTMENT (OUTPATIENT)
Dept: CT IMAGING | Age: 62
End: 2022-07-20
Attending: EMERGENCY MEDICINE
Payer: COMMERCIAL

## 2022-07-20 ENCOUNTER — HOSPITAL ENCOUNTER (EMERGENCY)
Age: 62
Discharge: HOME | End: 2022-07-20
Attending: EMERGENCY MEDICINE
Payer: COMMERCIAL

## 2022-07-20 VITALS
SYSTOLIC BLOOD PRESSURE: 184 MMHG | BODY MASS INDEX: 34.15 KG/M2 | TEMPERATURE: 97.9 F | HEART RATE: 68 BPM | OXYGEN SATURATION: 99 % | DIASTOLIC BLOOD PRESSURE: 86 MMHG | WEIGHT: 200 LBS | RESPIRATION RATE: 18 BRPM | HEIGHT: 64 IN

## 2022-07-20 DIAGNOSIS — R51.9 NONINTRACTABLE HEADACHE, UNSPECIFIED CHRONICITY PATTERN, UNSPECIFIED HEADACHE TYPE: Primary | ICD-10-CM

## 2022-07-20 LAB
GLUCOSE BLD STRIP.AUTO-MCNC: 165 MG/DL (ref 65–117)
PERFORMED BY, TECHID: ABNORMAL

## 2022-07-20 PROCEDURE — 70450 CT HEAD/BRAIN W/O DYE: CPT

## 2022-07-20 PROCEDURE — 96375 TX/PRO/DX INJ NEW DRUG ADDON: CPT

## 2022-07-20 PROCEDURE — 74011250636 HC RX REV CODE- 250/636: Performed by: EMERGENCY MEDICINE

## 2022-07-20 PROCEDURE — 99284 EMERGENCY DEPT VISIT MOD MDM: CPT

## 2022-07-20 PROCEDURE — 82962 GLUCOSE BLOOD TEST: CPT

## 2022-07-20 PROCEDURE — 96374 THER/PROPH/DIAG INJ IV PUSH: CPT

## 2022-07-20 RX ORDER — BUTALBITAL, ACETAMINOPHEN AND CAFFEINE 300; 40; 50 MG/1; MG/1; MG/1
1 CAPSULE ORAL
Qty: 20 CAPSULE | Refills: 0 | Status: SHIPPED | OUTPATIENT
Start: 2022-07-20 | End: 2022-07-20

## 2022-07-20 RX ORDER — DIPHENHYDRAMINE HYDROCHLORIDE 50 MG/ML
25 INJECTION, SOLUTION INTRAMUSCULAR; INTRAVENOUS
Status: COMPLETED | OUTPATIENT
Start: 2022-07-20 | End: 2022-07-20

## 2022-07-20 RX ORDER — BUTALBITAL, ACETAMINOPHEN AND CAFFEINE 300; 40; 50 MG/1; MG/1; MG/1
1 CAPSULE ORAL
Qty: 20 CAPSULE | Refills: 0 | Status: SHIPPED | OUTPATIENT
Start: 2022-07-20

## 2022-07-20 RX ORDER — KETOROLAC TROMETHAMINE 30 MG/ML
15 INJECTION, SOLUTION INTRAMUSCULAR; INTRAVENOUS
Status: COMPLETED | OUTPATIENT
Start: 2022-07-20 | End: 2022-07-20

## 2022-07-20 RX ORDER — ONDANSETRON 4 MG/1
4 TABLET, FILM COATED ORAL
Qty: 20 TABLET | Refills: 0 | Status: SHIPPED | OUTPATIENT
Start: 2022-07-20

## 2022-07-20 RX ORDER — METOCLOPRAMIDE HYDROCHLORIDE 5 MG/ML
10 INJECTION INTRAMUSCULAR; INTRAVENOUS
Status: COMPLETED | OUTPATIENT
Start: 2022-07-20 | End: 2022-07-20

## 2022-07-20 RX ORDER — ONDANSETRON 4 MG/1
4 TABLET, FILM COATED ORAL
Qty: 20 TABLET | Refills: 0 | Status: SHIPPED | OUTPATIENT
Start: 2022-07-20 | End: 2022-07-20

## 2022-07-20 RX ADMIN — DIPHENHYDRAMINE HYDROCHLORIDE 25 MG: 50 INJECTION, SOLUTION INTRAMUSCULAR; INTRAVENOUS at 16:20

## 2022-07-20 RX ADMIN — METHYLPREDNISOLONE SODIUM SUCCINATE 125 MG: 125 INJECTION, POWDER, FOR SOLUTION INTRAMUSCULAR; INTRAVENOUS at 16:21

## 2022-07-20 RX ADMIN — KETOROLAC TROMETHAMINE 15 MG: 30 INJECTION, SOLUTION INTRAMUSCULAR; INTRAVENOUS at 16:21

## 2022-07-20 RX ADMIN — METOCLOPRAMIDE 10 MG: 5 INJECTION, SOLUTION INTRAMUSCULAR; INTRAVENOUS at 16:21

## 2022-07-20 NOTE — DISCHARGE INSTRUCTIONS
Thank you! Thank you for allowing me to care for you in the emergency department. It is my goal to provide you with excellent care. If you have not received excellent quality care, please ask to speak to the nurse manager. Please fill out the survey that will come to you by mail or email since we listen to your feedback! Below you will find a list of your tests from today's visit. Should you have any questions, please do not hesitate to call the emergency department. Labs  [email or web address]@    Radiologic Studies  [unfilled]  CT Results  (Last 48 hours)                 07/20/22 1611  CT HEAD WO CONT Final result    Impression:  Negative. Narrative:  EXAM: CT HEAD WO CONT       INDICATION: Right-sided headache       COMPARISON: None. CONTRAST: None. TECHNIQUE: Unenhanced CT of the head was performed using 5 mm images. Brain and   bone windows were generated. Coronal and sagittal reformats. CT dose reduction   was achieved through use of a standardized protocol tailored for this   examination and automatic exposure control for dose modulation. FINDINGS:   There is no extra-axial fluid collection hemorrhage shift or masses. CXR Results  (Last 48 hours)      None          ------------------------------------------------------------------------------------------------------------  The exam and treatment you received in the Emergency Department were for an urgent problem and are not intended as complete care. It is important that you follow-up with a doctor, nurse practitioner, or physician assistant to:  (1) confirm your diagnosis,  (2) re-evaluation of changes in your illness and treatment, and  (3) for ongoing care. Please take your discharge instructions with you when you go to your follow-up appointment. If you have any problem arranging a follow-up appointment, contact the Emergency Department.   If your symptoms become worse or you do not improve as expected and you are unable to reach your health care provider, please return to the Emergency Department. We are available 24 hours a day. If a prescription has been provided, please have it filled as soon as possible to prevent a delay in treatment. If you have any questions or reservations about taking the medication due to side effects or interactions with other medications, please call your primary care provider or contact the ER.

## 2022-07-20 NOTE — Clinical Note
[facility] EMERGENCY DEPT  [address] [place] [id number] [phone]    Work/School Note    Date: 7/20/2022    To Whom It May concern:    Missy Ferrell was seen and treated today in the emergency room by the following provider(s):  Attending Provider: Cassie Colmenares MD.      Missy Ferrell is excused from work/school on 07/20/22 and 07/21/22. She is medically clear to return to work/school on 7/22/2022.        Sincerely,          Michelet Shields MD

## 2022-07-20 NOTE — ED PROVIDER NOTES
EMERGENCY DEPARTMENT HISTORY AND PHYSICAL EXAM      Date: 7/20/2022  Patient Name: Inocencia Torres    History of Presenting Illness     Chief Complaint   Patient presents with    Headache       History Provided By: Patient    HPI: Inocencia Torres, 62 y.o. female with a significant past medical history of hypertension and migraines presents to the ED with 2 days history of migraine. Patient states the pain started initially on the left side which is normal for her migraines, but tonight is also on the right side of her head. Patient denies nausea or vomiting, denies fevers or chills, denies neck pain. Patient reports history of hypertension and is recently started taking medicines for the same. Patient reports her prescription and over-the-counter medications have not resolved to this headache. There are no other complaints, changes, or physical findings at this time. PCP: Jenaro Putnam MD    No current facility-administered medications on file prior to encounter. Current Outpatient Medications on File Prior to Encounter   Medication Sig Dispense Refill    amLODIPine (NORVASC) 5 mg tablet 1 tablet      semaglutide (Ozempic) 0.25 mg or 0.5 mg/dose (2 mg/1.5 ml) subq pen by SubCUTAneous route every seven (7) days. rimegepant (Nurtec ODT) 75 mg disintegrating tablet 1 po every other day for migraine prevention 16 Tablet 3    insulin glargine (LANTUS SOLOSTAR U-100 INSULIN) 100 unit/mL (3 mL) inpn Inject 60 units daily 60 mL 3    cholecalciferol (VITAMIN D3) (1000 Units /25 mcg) tablet Take 1,000 Units by mouth daily. atorvastatin (LIPITOR) 40 mg tablet Take 40 mg by mouth daily. losartan-hydroCHLOROthiazide (HYZAAR) 100-25 mg per tablet Take 1 Tab by mouth daily. evolocumab (REPATHA SURECLICK) pen injection 206 mg by SubCUTAneous route every fourteen (14) days. isosorbide mononitrate ER (IMDUR) 60 mg CR tablet Take 60 mg by mouth every morning.       nebivolol (BYSTOLIC) 10 mg tablet Take 10 mg by mouth daily. aspirin 81 mg tablet Take 81 mg by mouth daily. Past History     Past Medical History:  Past Medical History:   Diagnosis Date    Anxiety     Arthritis     Constipation     Coronary atherosclerosis of native coronary artery     s/p CABG    Diabetes (HCC)     DJD (degenerative joint disease)     Fatigue     Headache     High cholesterol     Hypertension     Joint pain     Migraine     Muscle pain     Neck pain     Nephrolithiasis        Past Surgical History:  Past Surgical History:   Procedure Laterality Date    ECHO 2D ADULT  1/2012    EF50-60%, mild MR    HC DEVICE ABLAT UTER NOVASURE      2007    HX CORONARY ARTERY BYPASS GRAFT  1/2012    LIMA-LAD, SVG-OM1-OM2, SVG-PDA    HX HEART CATHETERIZATION  1/2012    Inf hypo EF 45-50%, LAD 30, 80, 50%; LCx 40%, OM1 90%, RCA 90%,     HX MALIGNANT SKIN LESION EXCISION Left 07/24/2017    upper abdomen    HX OPEN REDUCTION INTERNAL FIXATION Left 1998    forearm    HX PARTIAL THYROIDECTOMY Right 08/31/2015    HX TOTAL ABDOMINAL HYSTERECTOMY  09/2016    NM CARDIAC SPECT W STRS/REST MULT  12/2011    NOrmal perfusion, EF 59%    MT ARTHROPLASTY,ELBOW,IMPLNT/RECONSTRUC         Family History:  Family History   Problem Relation Age of Onset    Coronary Art Dis Mother     Diabetes Mother     Hypertension Mother     COPD Mother     Heart Disease Maternal Grandmother     Heart Disease Maternal Grandfather     Thyroid Cancer Father     COPD Father     Lung Cancer Father        Social History:  Social History     Tobacco Use    Smoking status: Never    Smokeless tobacco: Never   Vaping Use    Vaping Use: Never used   Substance Use Topics    Alcohol use: Yes     Comment: occasionally    Drug use: Not Currently       Allergies:  No Known Allergies    Review of Systems   Review of Systems  Review of Systems   Constitutional: Negative for chills and fever. HENT: Negative for sinus pressure and sinus pain.     Eyes: Positive for photophobia and negative for redness. Respiratory: Negative for shortness of breath and wheezing. Cardiovascular: Negative for chest pain and palpitations. Gastrointestinal: Negative for abdominal pain and nausea. Genitourinary: Negative for flank pain and hematuria. Musculoskeletal: Negative for arthralgias and gait problem. Skin: Negative for color change and pallor. Neurological: Negative for dizziness and weakness. Physical Exam   Physical Exam  Physical Exam  Constitutional:       General: No acute distress. Appearance: Normal appearance. Not toxic-appearing. HENT:      Head: Normocephalic and atraumatic. Nose: Nose normal.      Mouth/Throat:      Mouth: Mucous membranes are moist.   Eyes:      Extraocular Movements: Extraocular movements intact. Pupils: Pupils are equal, round, and reactive to light. Cardiovascular:      Rate and Rhythm: Normal rate. Pulses: Normal pulses. Pulmonary:      Effort: Pulmonary effort is normal.      Breath sounds: No stridor. Abdominal:      General: Abdomen is flat. There is no distension. Musculoskeletal:         General: Normal range of motion. Cervical back: Normal range of motion and neck supple. Skin:     General: Skin is warm and dry. Capillary Refill: Capillary refill takes less than 2 seconds. Neurological:      General: No focal deficit present. Mental Status: Aert and oriented to person, place, and time. Psychiatric:         Mood and Affect: Mood normal.         Behavior: Behavior normal.     Lab and Diagnostic Study Results   Labs -     Recent Results (from the past 12 hour(s))   GLUCOSE, POC    Collection Time: 07/20/22  4:36 PM   Result Value Ref Range    Glucose (POC) 165 (H) 65 - 117 mg/dL    Performed by [facility]        Radiologic Studies -   @[email or web address]@  CT Results  (Last 48 hours)                 07/20/22 1611  CT HEAD WO CONT Final result    Impression:  Negative.        Narrative:  EXAM: CT HEAD WO CONT       INDICATION: Right-sided headache       COMPARISON: None. CONTRAST: None. TECHNIQUE: Unenhanced CT of the head was performed using 5 mm images. Brain and   bone windows were generated. Coronal and sagittal reformats. CT dose reduction   was achieved through use of a standardized protocol tailored for this   examination and automatic exposure control for dose modulation. FINDINGS:   There is no extra-axial fluid collection hemorrhage shift or masses. CXR Results  (Last 48 hours)      None            Medical Decision Making and ED Course   - I am the first provider for this patient. I reviewed the vital signs, available nursing notes, past medical history, past surgical history, family history and social history. - Initial assessment performed. The patients presenting problems have been discussed, and they are in agreement with the care plan formulated and outlined with them. I have encouraged them to ask questions as they arise throughout their visit. Vital Signs-Reviewed the patient's vital signs. Patient Vitals for the past 12 hrs:   Temp Pulse Resp BP SpO2   07/20/22 1531 97.9 °F (36.6 °C) 66 18 (!) 188/88 100 %          ED Course:   ED Course as of 07/20/22 1803   Wed Jul 20, 2022   1800 Patient reports feeling better at this time. Discussed outpatient treatment plan as well as need for close follow-up and return precautions. [CS]      ED Course User Index  [CS] David Monique MD       Disposition   Disposition: DC- Adult Discharges: All of the diagnostic tests were reviewed and questions answered. Diagnosis, care plan and treatment options were discussed. The patient understands the instructions and will follow up as directed. The patients results have been reviewed with them. They have been counseled regarding their diagnosis.   The patient verbally convey understanding and agreement of the signs, symptoms, diagnosis, treatment and prognosis and additionally agrees to follow up as recommended with their PCP in 24 - 48 hours. They also agree with the care-plan and convey that all of their questions have been answered. I have also put together some discharge instructions for them that include: 1) educational information regarding their diagnosis, 2) how to care for their diagnosis at home, as well a 3) list of reasons why they would want to return to the ED prior to their follow-up appointment, should their condition change. Discharged    DISCHARGE PLAN:  1. Current Discharge Medication List        CONTINUE these medications which have NOT CHANGED    Details   amLODIPine (NORVASC) 5 mg tablet 1 tablet      semaglutide (Ozempic) 0.25 mg or 0.5 mg/dose (2 mg/1.5 ml) subq pen by SubCUTAneous route every seven (7) days. rimegepant (Nurtec ODT) 75 mg disintegrating tablet 1 po every other day for migraine prevention  Qty: 16 Tablet, Refills: 3      insulin glargine (LANTUS SOLOSTAR U-100 INSULIN) 100 unit/mL (3 mL) inpn Inject 60 units daily  Qty: 60 mL, Refills: 3    Associated Diagnoses: Type 2 diabetes mellitus with hyperglycemia, with long-term current use of insulin (HCC)      cholecalciferol (VITAMIN D3) (1000 Units /25 mcg) tablet Take 1,000 Units by mouth daily. atorvastatin (LIPITOR) 40 mg tablet Take 40 mg by mouth daily. losartan-hydroCHLOROthiazide (HYZAAR) 100-25 mg per tablet Take 1 Tab by mouth daily. evolocumab (REPATHA SURECLICK) pen injection 302 mg by SubCUTAneous route every fourteen (14) days. isosorbide mononitrate ER (IMDUR) 60 mg CR tablet Take 60 mg by mouth every morning. nebivolol (BYSTOLIC) 10 mg tablet Take 10 mg by mouth daily. aspirin 81 mg tablet Take 81 mg by mouth daily. 2.   Follow-up Information       Follow up With Specialties Details Why Contact Info    Bolivar Garcia MD Neurology  As needed [address] [facility]  [address] [place]   [phone]            3. Return to ED if worse   4. Current Discharge Medication List        START taking these medications    Details   butalbital-acetaminophen-caff (Fioricet) -40 mg per capsule Take 1 Capsule by mouth every four (4) hours as needed for Headache. Qty: 20 Capsule, Refills: 0  Start date: 7/20/2022      ondansetron hcl (Zofran) 4 mg tablet Take 1 Tablet by mouth every eight (8) hours as needed for Nausea. Qty: 20 Tablet, Refills: 0  Start date: 7/20/2022               Diagnosis/Clinical Impression     Clinical Impression:   1. Nonintractable headache, unspecified chronicity pattern, unspecified headache type        Attestations: Torres Hall MD, am the primary clinician of record. Please note that this dictation was completed with [employer], the [employer] voice recognition software. Quite often unanticipated grammatical, syntax, homophones, and other interpretive errors are inadvertently transcribed by the computer software. Please disregard these errors. Please excuse any errors that have escaped final proofreading. Thank you.

## 2022-09-09 ENCOUNTER — TELEPHONE (OUTPATIENT)
Dept: NEUROLOGY | Age: 62
End: 2022-09-09

## 2022-09-09 NOTE — TELEPHONE ENCOUNTER
Re: Sandy ESCOBAR request via [employer]. Created CMM Key# [id number], submitted and vd PA approval effective 09/09/2-09/09/23 auth# [id number]. Sent update to [facility] rep.

## 2024-05-08 NOTE — ED TRIAGE NOTES
Pt arrives to ED with . Pt reports headache x2 days. Hx of migraines and reports \"this has been different. \" Pt takes Nurtec for migraines every other day and stated it has not been helping headaches. Pt denies extremity weakness, slurred speech or facial droop. Pt reports \"mouth tingling\" but reports she has had that symptom \"for a long time. \" Hx of HTN and reports she just started taking BP meds again recently. No

## 2025-06-23 ENCOUNTER — TRANSCRIBE ORDERS (OUTPATIENT)
Facility: HOSPITAL | Age: 65
End: 2025-06-23

## 2025-06-23 DIAGNOSIS — M47.812 CERVICAL SPONDYLOSIS: Primary | ICD-10-CM

## 2025-06-23 DIAGNOSIS — M54.10 BILATERAL RADICULAR PAIN: ICD-10-CM

## 2025-07-02 ENCOUNTER — HOSPITAL ENCOUNTER (OUTPATIENT)
Facility: HOSPITAL | Age: 65
Discharge: HOME | End: 2025-07-05
Payer: COMMERCIAL

## 2025-07-02 DIAGNOSIS — M54.10 BILATERAL RADICULAR PAIN: ICD-10-CM

## 2025-07-02 DIAGNOSIS — M47.812 CERVICAL SPONDYLOSIS: ICD-10-CM

## 2025-07-02 PROCEDURE — 72141 MRI NECK SPINE W/O DYE: CPT
